# Patient Record
Sex: FEMALE | Race: WHITE | Employment: FULL TIME | URBAN - METROPOLITAN AREA
[De-identification: names, ages, dates, MRNs, and addresses within clinical notes are randomized per-mention and may not be internally consistent; named-entity substitution may affect disease eponyms.]

---

## 2017-02-13 PROBLEM — R93.1 ABNORMAL HEART SCORE CT: Status: ACTIVE | Noted: 2017-02-13

## 2018-08-29 ENCOUNTER — APPOINTMENT (OUTPATIENT)
Dept: CT IMAGING | Age: 53
End: 2018-08-29
Attending: EMERGENCY MEDICINE
Payer: COMMERCIAL

## 2018-08-29 ENCOUNTER — APPOINTMENT (OUTPATIENT)
Dept: ULTRASOUND IMAGING | Age: 53
End: 2018-08-29
Attending: EMERGENCY MEDICINE
Payer: COMMERCIAL

## 2018-08-29 ENCOUNTER — HOSPITAL ENCOUNTER (EMERGENCY)
Age: 53
Discharge: LEFT BEFORE DISCHARGE | End: 2018-08-29
Attending: EMERGENCY MEDICINE
Payer: COMMERCIAL

## 2018-08-29 ENCOUNTER — APPOINTMENT (OUTPATIENT)
Dept: GENERAL RADIOLOGY | Age: 53
End: 2018-08-29
Attending: EMERGENCY MEDICINE
Payer: COMMERCIAL

## 2018-08-29 VITALS
BODY MASS INDEX: 24.32 KG/M2 | RESPIRATION RATE: 18 BRPM | HEIGHT: 65 IN | SYSTOLIC BLOOD PRESSURE: 169 MMHG | DIASTOLIC BLOOD PRESSURE: 91 MMHG | WEIGHT: 146 LBS | HEART RATE: 72 BPM | TEMPERATURE: 99 F | OXYGEN SATURATION: 97 %

## 2018-08-29 DIAGNOSIS — R10.13 ABDOMINAL PAIN, EPIGASTRIC: Primary | ICD-10-CM

## 2018-08-29 DIAGNOSIS — R51.9 ACUTE NONINTRACTABLE HEADACHE, UNSPECIFIED HEADACHE TYPE: ICD-10-CM

## 2018-08-29 LAB
ALBUMIN SERPL-MCNC: 4 G/DL (ref 3.5–4.8)
ALBUMIN/GLOB SERPL: 1.2 {RATIO} (ref 1–2)
ALP LIVER SERPL-CCNC: 73 U/L (ref 41–108)
ALT SERPL-CCNC: 32 U/L (ref 14–54)
ANION GAP SERPL CALC-SCNC: 7 MMOL/L (ref 0–18)
APTT PPP: 26.6 SECONDS (ref 26.1–34.6)
AST SERPL-CCNC: 26 U/L (ref 15–41)
BASOPHILS # BLD AUTO: 0.04 X10(3) UL (ref 0–0.1)
BASOPHILS NFR BLD AUTO: 0.5 %
BILIRUB SERPL-MCNC: 0.6 MG/DL (ref 0.1–2)
BUN BLD-MCNC: 14 MG/DL (ref 8–20)
BUN/CREAT SERPL: 18.7 (ref 10–20)
CALCIUM BLD-MCNC: 8.8 MG/DL (ref 8.3–10.3)
CHLORIDE SERPL-SCNC: 104 MMOL/L (ref 101–111)
CO2 SERPL-SCNC: 28 MMOL/L (ref 22–32)
CREAT BLD-MCNC: 0.75 MG/DL (ref 0.55–1.02)
D-DIMER: <0.27 UG/ML FEU (ref 0–0.49)
EOSINOPHIL # BLD AUTO: 0.11 X10(3) UL (ref 0–0.3)
EOSINOPHIL NFR BLD AUTO: 1.3 %
ERYTHROCYTE [DISTWIDTH] IN BLOOD BY AUTOMATED COUNT: 12.4 % (ref 11.5–16)
GLOBULIN PLAS-MCNC: 3.4 G/DL (ref 2.5–4)
GLUCOSE BLD-MCNC: 96 MG/DL (ref 70–99)
HCT VFR BLD AUTO: 44.1 % (ref 34–50)
HGB BLD-MCNC: 14.5 G/DL (ref 12–16)
IMMATURE GRANULOCYTE COUNT: 0.02 X10(3) UL (ref 0–1)
IMMATURE GRANULOCYTE RATIO %: 0.2 %
INR BLD: 0.93 (ref 0.92–1.08)
LIPASE: 268 U/L (ref 73–393)
LYMPHOCYTES # BLD AUTO: 2.67 X10(3) UL (ref 0.9–4)
LYMPHOCYTES NFR BLD AUTO: 30.8 %
M PROTEIN MFR SERPL ELPH: 7.4 G/DL (ref 6.1–8.3)
MCH RBC QN AUTO: 30.7 PG (ref 27–33.2)
MCHC RBC AUTO-ENTMCNC: 32.9 G/DL (ref 31–37)
MCV RBC AUTO: 93.4 FL (ref 81–100)
MONOCYTES # BLD AUTO: 0.54 X10(3) UL (ref 0.1–1)
MONOCYTES NFR BLD AUTO: 6.2 %
NEUTROPHIL ABS PRELIM: 5.3 X10 (3) UL (ref 1.3–6.7)
NEUTROPHILS # BLD AUTO: 5.3 X10(3) UL (ref 1.3–6.7)
NEUTROPHILS NFR BLD AUTO: 61 %
OSMOLALITY SERPL CALC.SUM OF ELEC: 288 MOSM/KG (ref 275–295)
PLATELET # BLD AUTO: 269 10(3)UL (ref 150–450)
POTASSIUM SERPL-SCNC: 4.3 MMOL/L (ref 3.6–5.1)
PSA SERPL DL<=0.01 NG/ML-MCNC: 12.5 SECONDS (ref 12.5–14.1)
RBC # BLD AUTO: 4.72 X10(6)UL (ref 3.8–5.1)
RED CELL DISTRIBUTION WIDTH-SD: 43.1 FL (ref 35.1–46.3)
SODIUM SERPL-SCNC: 139 MMOL/L (ref 136–144)
TROPONIN I SERPL-MCNC: <0.046 NG/ML (ref ?–0.05)
WBC # BLD AUTO: 8.7 X10(3) UL (ref 4–13)

## 2018-08-29 PROCEDURE — 85025 COMPLETE CBC W/AUTO DIFF WBC: CPT | Performed by: EMERGENCY MEDICINE

## 2018-08-29 PROCEDURE — 85610 PROTHROMBIN TIME: CPT | Performed by: EMERGENCY MEDICINE

## 2018-08-29 PROCEDURE — 70450 CT HEAD/BRAIN W/O DYE: CPT | Performed by: EMERGENCY MEDICINE

## 2018-08-29 PROCEDURE — 96361 HYDRATE IV INFUSION ADD-ON: CPT

## 2018-08-29 PROCEDURE — 85730 THROMBOPLASTIN TIME PARTIAL: CPT | Performed by: EMERGENCY MEDICINE

## 2018-08-29 PROCEDURE — 83690 ASSAY OF LIPASE: CPT | Performed by: EMERGENCY MEDICINE

## 2018-08-29 PROCEDURE — 76700 US EXAM ABDOM COMPLETE: CPT | Performed by: EMERGENCY MEDICINE

## 2018-08-29 PROCEDURE — 80053 COMPREHEN METABOLIC PANEL: CPT | Performed by: EMERGENCY MEDICINE

## 2018-08-29 PROCEDURE — 99285 EMERGENCY DEPT VISIT HI MDM: CPT

## 2018-08-29 PROCEDURE — 85378 FIBRIN DEGRADE SEMIQUANT: CPT | Performed by: EMERGENCY MEDICINE

## 2018-08-29 PROCEDURE — 93005 ELECTROCARDIOGRAM TRACING: CPT | Performed by: INTERNAL MEDICINE

## 2018-08-29 PROCEDURE — 93005 ELECTROCARDIOGRAM TRACING: CPT

## 2018-08-29 PROCEDURE — 71046 X-RAY EXAM CHEST 2 VIEWS: CPT | Performed by: EMERGENCY MEDICINE

## 2018-08-29 PROCEDURE — 96374 THER/PROPH/DIAG INJ IV PUSH: CPT

## 2018-08-29 PROCEDURE — 84484 ASSAY OF TROPONIN QUANT: CPT | Performed by: EMERGENCY MEDICINE

## 2018-08-29 RX ORDER — ASPIRIN 81 MG/1
324 TABLET, CHEWABLE ORAL ONCE
Status: COMPLETED | OUTPATIENT
Start: 2018-08-29 | End: 2018-08-29

## 2018-08-29 RX ORDER — FAMOTIDINE 10 MG
20 TABLET ORAL DAILY
COMMUNITY
End: 2019-11-08 | Stop reason: ALTCHOICE

## 2018-08-29 RX ORDER — KETOROLAC TROMETHAMINE 30 MG/ML
30 INJECTION, SOLUTION INTRAMUSCULAR; INTRAVENOUS ONCE
Status: COMPLETED | OUTPATIENT
Start: 2018-08-29 | End: 2018-08-29

## 2018-08-29 RX ORDER — PANTOPRAZOLE SODIUM 40 MG/1
40 TABLET, DELAYED RELEASE ORAL DAILY
Qty: 30 TABLET | Refills: 0 | Status: SHIPPED | OUTPATIENT
Start: 2018-08-29 | End: 2018-09-28

## 2018-08-29 RX ORDER — MAGNESIUM HYDROXIDE/ALUMINUM HYDROXICE/SIMETHICONE 120; 1200; 1200 MG/30ML; MG/30ML; MG/30ML
30 SUSPENSION ORAL ONCE
Status: COMPLETED | OUTPATIENT
Start: 2018-08-29 | End: 2018-08-29

## 2018-08-29 RX ORDER — SODIUM CHLORIDE 9 MG/ML
INJECTION, SOLUTION INTRAVENOUS ONCE
Status: COMPLETED | OUTPATIENT
Start: 2018-08-29 | End: 2018-08-29

## 2018-08-29 RX ORDER — SUCRALFATE 1 G/1
1 TABLET ORAL
Qty: 30 TABLET | Refills: 0 | Status: SHIPPED | OUTPATIENT
Start: 2018-08-29 | End: 2019-10-28

## 2018-08-29 NOTE — ED NOTES
Pressure to head and shoulders resolved. C/o pain to sub sternal pain, rates 4/10, non-radiating. States it feels like burning pain. Requesting additional testing for abdomen. ED MD informed.

## 2018-08-29 NOTE — ED PROVIDER NOTES
Patient Seen in: THE [facility] Emergency Department In [place]    History   Patient presents with:  Chest Pain Angina (cardiovascular)  Headache (neurologic)    Stated Complaint: sharp epigastric/chest pain, now pain in head and not feeling well    HPI    Pa air)    Current:BP (!) 169/91   Pulse 72   Temp 98.5 °F (36.9 °C) (Oral)   Resp 18   Ht 165.1 cm (5' 5\")   Wt 66.2 kg   SpO2 97%   BMI 24.30 kg/m²         Physical Exam  GENERAL: Well-developed, well-nourished female sitting up breathing easily in no appa WITH DIFFERENTIAL WITH PLATELET    Narrative: The following orders were created for panel order CBC WITH DIFFERENTIAL WITH PLATELET.   Procedure                               Abnormality         Status                     --------- shows evidence of a 2.4 cm left renal cyst but no evidence of any gallstones appreciated. Patient did undergo a CT the brain secondary to patient's history of headache as well as hypertension the results of which are pending at the time of this dictation.

## 2018-08-29 NOTE — ED INITIAL ASSESSMENT (HPI)
Patient c/o to mid sternal chest pain at 10am today that radiated to back and up to back of head. Pain lasted 15 minutes. Still having pressure to head. Denies N/V.  +clammy.

## 2018-09-04 LAB
ATRIAL RATE: 60 BPM
P AXIS: 43 DEGREES
P-R INTERVAL: 144 MS
Q-T INTERVAL: 438 MS
QRS DURATION: 86 MS
QTC CALCULATION (BEZET): 438 MS
R AXIS: -21 DEGREES
T AXIS: 19 DEGREES
VENTRICULAR RATE: 60 BPM

## 2019-11-08 PROBLEM — R09.81 SINUS CONGESTION: Status: ACTIVE | Noted: 2019-11-08

## 2019-11-08 PROBLEM — K21.9 GASTROESOPHAGEAL REFLUX DISEASE, ESOPHAGITIS PRESENCE NOT SPECIFIED: Status: ACTIVE | Noted: 2019-11-08

## 2020-03-05 ENCOUNTER — HOSPITAL ENCOUNTER (OUTPATIENT)
Age: 55
Discharge: HOME | End: 2020-03-05
Payer: COMMERCIAL

## 2020-03-05 VITALS
OXYGEN SATURATION: 99 % | SYSTOLIC BLOOD PRESSURE: 124 MMHG | WEIGHT: 150 LBS | DIASTOLIC BLOOD PRESSURE: 73 MMHG | HEIGHT: 65 IN | RESPIRATION RATE: 20 BRPM | BODY MASS INDEX: 24.99 KG/M2 | TEMPERATURE: 99 F | HEART RATE: 106 BPM

## 2020-03-05 DIAGNOSIS — J10.1 INFLUENZA A: Primary | ICD-10-CM

## 2020-03-05 LAB
POCT INFLUENZA A: POSITIVE
POCT INFLUENZA B: NEGATIVE

## 2020-03-05 PROCEDURE — 87502 INFLUENZA DNA AMP PROBE: CPT | Performed by: NURSE PRACTITIONER

## 2020-03-05 PROCEDURE — 99214 OFFICE O/P EST MOD 30 MIN: CPT

## 2020-03-05 PROCEDURE — 99213 OFFICE O/P EST LOW 20 MIN: CPT

## 2020-03-05 RX ORDER — OSELTAMIVIR PHOSPHATE 75 MG/1
75 CAPSULE ORAL 2 TIMES DAILY
Qty: 10 CAPSULE | Refills: 0 | Status: SHIPPED | OUTPATIENT
Start: 2020-03-05 | End: 2020-03-10 | Stop reason: SINTOL

## 2020-03-05 RX ORDER — BENZONATATE 100 MG/1
100 CAPSULE ORAL 3 TIMES DAILY PRN
Qty: 30 CAPSULE | Refills: 0 | Status: SHIPPED | OUTPATIENT
Start: 2020-03-05 | End: 2020-03-10

## 2020-03-05 NOTE — ED PROVIDER NOTES
Patient Seen in: [facility] [place] [facility]      History   Patient presents with:  Cough    Stated Complaint: fever / fast heart rate / chills / headache / cough / aches x 1 day    HPI    25-year-old female presents with cough, fever, headache, Ear: Tympanic membrane, ear canal and external ear normal.      Nose: Congestion present. Mouth/Throat:      Mouth: Mucous membranes are moist.      Pharynx: No oropharyngeal exudate or posterior oropharyngeal erythema.    Eyes:      Extraocular Moveme medications    Oseltamivir Phosphate (TAMIFLU) 75 MG Oral Cap  Take 1 capsule (75 mg total) by mouth 2 (two) times daily for 5 days.   Qty: 10 capsule Refills: 0    benzonatate 100 MG Oral Cap  Take 1 capsule (100 mg total) by mouth 3 (three) times daily as

## 2020-08-12 ENCOUNTER — OFFICE VISIT (OUTPATIENT)
Dept: OBGYN CLINIC | Facility: CLINIC | Age: 55
End: 2020-08-12
Payer: COMMERCIAL

## 2020-08-12 VITALS
WEIGHT: 154.38 LBS | BODY MASS INDEX: 26 KG/M2 | TEMPERATURE: 98 F | DIASTOLIC BLOOD PRESSURE: 98 MMHG | HEART RATE: 79 BPM | SYSTOLIC BLOOD PRESSURE: 142 MMHG

## 2020-08-12 DIAGNOSIS — Z12.4 CERVICAL CANCER SCREENING: ICD-10-CM

## 2020-08-12 DIAGNOSIS — Z78.0 MENOPAUSE: ICD-10-CM

## 2020-08-12 DIAGNOSIS — Z01.419 WELL FEMALE EXAM WITH ROUTINE GYNECOLOGICAL EXAM: Primary | ICD-10-CM

## 2020-08-12 DIAGNOSIS — Z12.31 ENCOUNTER FOR SCREENING MAMMOGRAM FOR MALIGNANT NEOPLASM OF BREAST: ICD-10-CM

## 2020-08-12 PROCEDURE — 3080F DIAST BP >= 90 MM HG: CPT | Performed by: OBSTETRICS & GYNECOLOGY

## 2020-08-12 PROCEDURE — 88175 CYTOPATH C/V AUTO FLUID REDO: CPT | Performed by: OBSTETRICS & GYNECOLOGY

## 2020-08-12 PROCEDURE — 87624 HPV HI-RISK TYP POOLED RSLT: CPT | Performed by: OBSTETRICS & GYNECOLOGY

## 2020-08-12 PROCEDURE — 99386 PREV VISIT NEW AGE 40-64: CPT | Performed by: OBSTETRICS & GYNECOLOGY

## 2020-08-12 PROCEDURE — 3077F SYST BP >= 140 MM HG: CPT | Performed by: OBSTETRICS & GYNECOLOGY

## 2020-08-12 NOTE — PROGRESS NOTES
GYN H&P     2020  12:05 PM    CC: Patient is here for annnual    HPI: patient is a 48year old  here for her annual gyn exam.   She has no complaints. Menopausal. Uses amberen with good symptom relief. Has some vaginal dryness.  Denies any pelvi anxiety/depression   • Heart Disorder Maternal Uncle         CABG   • Other (Other) Sister         gall bladder   • Heart Disorder Maternal Uncle         pacemaker     Social History    Socioeconomic History      Marital status:       Spouse name: N Helmet: Not Asked        Seat Belt: Yes        Self-Exams: Not Asked    Social History Narrative      Not on file      ROS:     Review of Systems:  General: denies fevers, chills, fatigue and malaise.    Eyes: no visual changes, denies headaches  ENT: no co or fissures                     Vagina: normal pink mucosa, no lesions, normal clear discharge.                       Uterus: av, mobile, non tender, normal size                     Cervix: parous, no lesions                     Adnexa: non tender, no steph

## 2020-08-13 LAB — HPV I/H RISK 1 DNA SPEC QL NAA+PROBE: NEGATIVE

## 2020-08-17 NOTE — PROGRESS NOTES
Left detailed message with normal pap and HPV  OK per Matheus Mariee Spine appears normal, range of motion is not limited, no muscle or joint tenderness

## 2021-03-26 ENCOUNTER — HOSPITAL ENCOUNTER (OUTPATIENT)
Dept: ULTRASOUND IMAGING | Age: 56
Discharge: HOME | End: 2021-03-26
Attending: FAMILY MEDICINE
Payer: COMMERCIAL

## 2021-03-26 DIAGNOSIS — R14.0 ABDOMINAL BLOATING: ICD-10-CM

## 2021-03-26 DIAGNOSIS — R10.12 LEFT UPPER QUADRANT ABDOMINAL PAIN: ICD-10-CM

## 2021-03-26 PROCEDURE — 76700 US EXAM ABDOM COMPLETE: CPT | Performed by: FAMILY MEDICINE

## 2021-03-27 NOTE — PROGRESS NOTES
Patient previously reviewed results on [employer]. Attached MD comments to results for patient to review. Nothing further needed from MD or nurse. Closing encounter.

## 2021-04-14 ENCOUNTER — HOSPITAL ENCOUNTER (OUTPATIENT)
Age: 56
Discharge: HOME | End: 2021-04-14
Payer: COMMERCIAL

## 2021-04-14 VITALS
DIASTOLIC BLOOD PRESSURE: 82 MMHG | RESPIRATION RATE: 16 BRPM | WEIGHT: 154 LBS | BODY MASS INDEX: 25.66 KG/M2 | SYSTOLIC BLOOD PRESSURE: 163 MMHG | TEMPERATURE: 100 F | OXYGEN SATURATION: 96 % | HEART RATE: 90 BPM | HEIGHT: 65 IN

## 2021-04-14 DIAGNOSIS — U07.1 COVID-19 VIRUS INFECTION: Primary | ICD-10-CM

## 2021-04-14 PROCEDURE — 99213 OFFICE O/P EST LOW 20 MIN: CPT | Performed by: NURSE PRACTITIONER

## 2021-04-14 PROCEDURE — 99453 REM MNTR PHYSIOL PARAM SETUP: CPT | Performed by: NURSE PRACTITIONER

## 2021-04-14 PROCEDURE — 99212 OFFICE O/P EST SF 10 MIN: CPT | Performed by: NURSE PRACTITIONER

## 2021-04-14 NOTE — ED INITIAL ASSESSMENT (HPI)
Cough x 1 week, sinus pressure/headache x 2 weeks - worse over last 2 days, tmax 100f;  has covid, pt tested neg on sunday

## 2021-04-14 NOTE — ED PROVIDER NOTES
Patient Seen in: [facility]      History   Patient presents with:  Cough/URI  Fever    Stated Complaint: sinus pressure,post nasal drip    HPI/Subjective:   HPI  78-year-old female presents to immediate care thinking she possibly has a si distress. Appearance: Normal appearance. She is well-developed. She is not ill-appearing or toxic-appearing.    HENT:      Right Ear: Tympanic membrane, ear canal and external ear normal.      Left Ear: Tympanic membrane, ear canal and external ear norm encounter diagnosis)     Disposition:  Discharge  4/14/2021 11:47 am    Follow-up:  Princess Faust, [address] [phone]    Call   As needed          Medications Prescribed:  Discharge Medication List as of 4/14/2021 11:47

## 2021-04-19 ENCOUNTER — APPOINTMENT (OUTPATIENT)
Dept: CT IMAGING | Age: 56
DRG: 177 | End: 2021-04-19
Attending: EMERGENCY MEDICINE
Payer: COMMERCIAL

## 2021-04-19 ENCOUNTER — APPOINTMENT (OUTPATIENT)
Dept: GENERAL RADIOLOGY | Age: 56
DRG: 177 | End: 2021-04-19
Attending: EMERGENCY MEDICINE
Payer: COMMERCIAL

## 2021-04-19 ENCOUNTER — HOSPITAL ENCOUNTER (INPATIENT)
Facility: HOSPITAL | Age: 56
LOS: 5 days | Discharge: HOME | DRG: 177 | End: 2021-04-24
Attending: EMERGENCY MEDICINE | Admitting: HOSPITALIST
Payer: COMMERCIAL

## 2021-04-19 DIAGNOSIS — U07.1 PNEUMONIA DUE TO COVID-19 VIRUS: Primary | ICD-10-CM

## 2021-04-19 DIAGNOSIS — J12.82 PNEUMONIA DUE TO COVID-19 VIRUS: Primary | ICD-10-CM

## 2021-04-19 PROBLEM — R73.9 HYPERGLYCEMIA: Status: ACTIVE | Noted: 2021-04-19

## 2021-04-19 PROBLEM — E87.1 HYPONATREMIA: Status: ACTIVE | Noted: 2021-04-19

## 2021-04-19 PROBLEM — D69.6 THROMBOCYTOPENIA (HCC): Status: ACTIVE | Noted: 2021-04-19

## 2021-04-19 PROCEDURE — 84145 PROCALCITONIN (PCT): CPT | Performed by: EMERGENCY MEDICINE

## 2021-04-19 PROCEDURE — 83615 LACTATE (LD) (LDH) ENZYME: CPT | Performed by: EMERGENCY MEDICINE

## 2021-04-19 PROCEDURE — 85379 FIBRIN DEGRADATION QUANT: CPT | Performed by: EMERGENCY MEDICINE

## 2021-04-19 PROCEDURE — 84484 ASSAY OF TROPONIN QUANT: CPT | Performed by: EMERGENCY MEDICINE

## 2021-04-19 PROCEDURE — 99285 EMERGENCY DEPT VISIT HI MDM: CPT

## 2021-04-19 PROCEDURE — 96361 HYDRATE IV INFUSION ADD-ON: CPT

## 2021-04-19 PROCEDURE — 82550 ASSAY OF CK (CPK): CPT | Performed by: EMERGENCY MEDICINE

## 2021-04-19 PROCEDURE — 85025 COMPLETE CBC W/AUTO DIFF WBC: CPT | Performed by: EMERGENCY MEDICINE

## 2021-04-19 PROCEDURE — 96375 TX/PRO/DX INJ NEW DRUG ADDON: CPT

## 2021-04-19 PROCEDURE — 71275 CT ANGIOGRAPHY CHEST: CPT | Performed by: EMERGENCY MEDICINE

## 2021-04-19 PROCEDURE — 86140 C-REACTIVE PROTEIN: CPT | Performed by: EMERGENCY MEDICINE

## 2021-04-19 PROCEDURE — 71045 X-RAY EXAM CHEST 1 VIEW: CPT | Performed by: EMERGENCY MEDICINE

## 2021-04-19 PROCEDURE — 93010 ELECTROCARDIOGRAM REPORT: CPT

## 2021-04-19 PROCEDURE — 82728 ASSAY OF FERRITIN: CPT | Performed by: EMERGENCY MEDICINE

## 2021-04-19 PROCEDURE — 81001 URINALYSIS AUTO W/SCOPE: CPT | Performed by: EMERGENCY MEDICINE

## 2021-04-19 PROCEDURE — 93005 ELECTROCARDIOGRAM TRACING: CPT

## 2021-04-19 PROCEDURE — 83880 ASSAY OF NATRIURETIC PEPTIDE: CPT | Performed by: EMERGENCY MEDICINE

## 2021-04-19 PROCEDURE — 80053 COMPREHEN METABOLIC PANEL: CPT | Performed by: EMERGENCY MEDICINE

## 2021-04-19 PROCEDURE — 36415 COLL VENOUS BLD VENIPUNCTURE: CPT

## 2021-04-19 PROCEDURE — 87040 BLOOD CULTURE FOR BACTERIA: CPT | Performed by: EMERGENCY MEDICINE

## 2021-04-19 PROCEDURE — 96374 THER/PROPH/DIAG INJ IV PUSH: CPT

## 2021-04-19 RX ORDER — PANTOPRAZOLE SODIUM 20 MG/1
20 TABLET, DELAYED RELEASE ORAL
Refills: 1 | Status: DISCONTINUED | OUTPATIENT
Start: 2021-04-20 | End: 2021-04-24

## 2021-04-19 RX ORDER — DEXAMETHASONE SODIUM PHOSPHATE 10 MG/ML
6 INJECTION, SOLUTION INTRAMUSCULAR; INTRAVENOUS ONCE
Status: COMPLETED | OUTPATIENT
Start: 2021-04-19 | End: 2021-04-19

## 2021-04-19 RX ORDER — ONDANSETRON 2 MG/ML
4 INJECTION INTRAMUSCULAR; INTRAVENOUS EVERY 4 HOURS PRN
Status: DISCONTINUED | OUTPATIENT
Start: 2021-04-19 | End: 2021-04-19

## 2021-04-19 RX ORDER — ENOXAPARIN SODIUM 100 MG/ML
40 INJECTION SUBCUTANEOUS DAILY
Status: DISCONTINUED | OUTPATIENT
Start: 2021-04-19 | End: 2021-04-24

## 2021-04-19 RX ORDER — BISACODYL 10 MG
10 SUPPOSITORY, RECTAL RECTAL
Status: DISCONTINUED | OUTPATIENT
Start: 2021-04-19 | End: 2021-04-24

## 2021-04-19 RX ORDER — LISINOPRIL 40 MG/1
40 TABLET ORAL DAILY
Refills: 3 | Status: DISCONTINUED | OUTPATIENT
Start: 2021-04-20 | End: 2021-04-24

## 2021-04-19 RX ORDER — POLYETHYLENE GLYCOL 3350 17 G/17G
17 POWDER, FOR SOLUTION ORAL DAILY PRN
Status: DISCONTINUED | OUTPATIENT
Start: 2021-04-19 | End: 2021-04-24

## 2021-04-19 RX ORDER — SODIUM CHLORIDE 9 MG/ML
INJECTION, SOLUTION INTRAVENOUS CONTINUOUS
Status: ACTIVE | OUTPATIENT
Start: 2021-04-19 | End: 2021-04-19

## 2021-04-19 RX ORDER — DEXAMETHASONE SODIUM PHOSPHATE 4 MG/ML
6 VIAL (ML) INJECTION DAILY
Status: DISCONTINUED | OUTPATIENT
Start: 2021-04-19 | End: 2021-04-23

## 2021-04-19 RX ORDER — ONDANSETRON 2 MG/ML
4 INJECTION INTRAMUSCULAR; INTRAVENOUS EVERY 6 HOURS PRN
Status: DISCONTINUED | OUTPATIENT
Start: 2021-04-19 | End: 2021-04-24

## 2021-04-19 RX ORDER — DOCUSATE SODIUM 100 MG/1
100 CAPSULE, LIQUID FILLED ORAL 2 TIMES DAILY
Status: DISCONTINUED | OUTPATIENT
Start: 2021-04-19 | End: 2021-04-24

## 2021-04-19 RX ORDER — ACETAMINOPHEN 325 MG/1
TABLET ORAL
Status: DISCONTINUED
Start: 2021-04-19 | End: 2021-04-19 | Stop reason: WASHOUT

## 2021-04-19 RX ORDER — SODIUM CHLORIDE 9 MG/ML
INJECTION, SOLUTION INTRAVENOUS CONTINUOUS
Status: DISCONTINUED | OUTPATIENT
Start: 2021-04-19 | End: 2021-04-21

## 2021-04-19 RX ORDER — ACETAMINOPHEN 500 MG
1000 TABLET ORAL ONCE
Status: COMPLETED | OUTPATIENT
Start: 2021-04-19 | End: 2021-04-19

## 2021-04-19 RX ORDER — ACETAMINOPHEN 325 MG/1
650 TABLET ORAL EVERY 6 HOURS PRN
Status: DISCONTINUED | OUTPATIENT
Start: 2021-04-19 | End: 2021-04-24

## 2021-04-19 RX ORDER — ONDANSETRON 2 MG/ML
4 INJECTION INTRAMUSCULAR; INTRAVENOUS ONCE
Status: COMPLETED | OUTPATIENT
Start: 2021-04-19 | End: 2021-04-19

## 2021-04-19 RX ORDER — ATORVASTATIN CALCIUM 40 MG/1
40 TABLET, FILM COATED ORAL DAILY
Status: DISCONTINUED | OUTPATIENT
Start: 2021-04-20 | End: 2021-04-24

## 2021-04-19 RX ORDER — SODIUM PHOSPHATE, DIBASIC AND SODIUM PHOSPHATE, MONOBASIC 7; 19 G/133ML; G/133ML
1 ENEMA RECTAL ONCE AS NEEDED
Status: DISCONTINUED | OUTPATIENT
Start: 2021-04-19 | End: 2021-04-24

## 2021-04-19 NOTE — CONSULTS
INFECTIOUS DISEASE CONSULT NOTE    Raf Sis Patient Status:  Inpatient    1965 MRN [id number]   [facility] 5NW-A Attending Marquez Hernandez MD   Hosp Day # 0 PCP Maggi Kim MD       Reason for Consultation:  Covid 19 infection atorvastatin (LIPITOR) tab 40 mg, 40 mg, Oral, Daily  •  [START ON 4/20/2021] lisinopril tab 40 mg, 40 mg, Oral, Daily  •  [START ON 4/20/2021] Pantoprazole Sodium (PROTONIX) EC tab 20 mg, 20 mg, Oral, QAM AC  •  Enoxaparin Sodium (LOVENOX) 40 MG/0.4ML inj 91.2   MCH 30.4   MCHC 33.3   RDW 13.0   NEPRELIM 3.68   WBC 4.8   .0*     Recent Labs   Lab 04/19/21  0749   *   BUN 12   CREATSERUM 0.96   GFRAA 77   GFRNAA 67   CA 8.2*   ALB 3.2*   *   K 3.6      CO2 26.0   ALKPHO 60   AST 46* Technologist)  Patient states that she passed out this morning. States she is dizzy and is nauseous.    CONTRAST USED:  100cc of Omnipaque 350  FINDINGS:  LUNGS:  There is nodular and confluent consolidation within the lungs, most notable within the lower l bile duct diameter is 4 mm. PANCREAS:  Normal. SPLEEN:  Normal. KIDNEYS:  Normal.  Right kidney measures 10.7 cm. Left kidney measures 11.6 cm. AORTA/IVC:  Normal.            CONCLUSION:  Sonographically unremarkable abdomen.     Dictated by (CST): Eduardo Melendez Case and plan d/w pt over the phone    Thank you for allowing me to participate in the care of this patient. Please do not hesitate to call if you have any questions.    I will continue to follow with you and will make further recommendations based on h

## 2021-04-19 NOTE — COVID NURSING ASSESSMENT
COVID-19 Daily Discharge Readiness-Nursing    O2 Sat at Rest:    92 % on 2L   O2 Sat with Exertion:    % on    liters   Temperature max from last 24 hrs: Temp (24hrs), Av.1 °F (37.8 °C), Min:98.7 °F (37.1 °C), Max:101.4 °F (38.6 °C)    Inflammatory Ma

## 2021-04-19 NOTE — PLAN OF CARE
- dietician following     NURSING ADMISSION NOTE      Patient admitted via Cart  Oriented to room. Safety precautions initiated. Bed in low position. Call light in reach. Pt A&Ox4, received on 2LNC maintaining spO2 >92%, NSR on tele. Low-grade temp upon arrival. VSS.  Pt n

## 2021-04-19 NOTE — H&P
[facility] Hospitalist H&P       CC: Patient presents with:  Covid  Dizziness  Fever       PCP: Marian Rosa MD    History of Present Illness:57 y/o f w hx of htn, hl, gerd tested positive 4/14 in outpt clinic, c/o fevers w coughing, no blood, seen in ER w ct EOMs intact. Nose: Nares normal. Septum midline. Mucosa normal. No drainage.    Throat: Lips, mucosa, and tongue normal. Teeth and gums normal.   Neck: Supple, symmetrical, trachea midline, no cervical or supraclavicular lymph adenopathy, thyroid: no enl PLAN:   56 y/o f w hx of htn, hl, gerd tested positive 4/14 in outpt clinic, c/o fevers    Covid: positive 4/14  -on 2L nc  -steroids  -c/s ID for remdesevir, rocephin w fever  -monitor inflammatory markers, dimer 2    Acute hypoxia d/t covid    Htn: ace

## 2021-04-19 NOTE — ED INITIAL ASSESSMENT (HPI)
Dx covid Tuesday - fever began Saturday C/o loss of taste and smell, aches, headache  Lightheadedness since yesterday

## 2021-04-19 NOTE — PLAN OF CARE
Problem: Patient/Family Goals  Goal: Patient/Family Long Term Goal  Description: Patient's Long Term Goal:    Interventions:  - See additional Care Plan goals for specific interventions  Outcome: Progressing  Goal: Patient/Family Short Term Goal  Descrip

## 2021-04-19 NOTE — ED PROVIDER NOTES
Patient Seen in: THE [facility] Emergency Department In [place]      History   Patient presents with:  Covid  Dizziness  Fever    Stated Complaint: passed out this am. dizzy. nausea, diarrhea. fever. + covid on Tuesday.     HPI/Subjective:   HPI     Patient is Wt 68.9 kg   LMP 08/20/2015 (Approximate)   SpO2 92%   BMI 25.29 kg/m²         Physical Exam  GENERAL: Patient resting  on the cart in no acute distress. HEENT: Extraocular muscles intact, pupils equal round reactive to light  neck supple, no meningismus. limits   PRO BETA NATRIURETIC PEPTIDE - Normal   TROPONIN I - Normal   URINE MICROSCOPIC W REFLEX CULTURE - Normal   CBC WITH DIFFERENTIAL WITH PLATELET    Narrative:      The following orders were created for panel order CBC WITH DIFFERENTIAL WITH PLATELET virus  (primary encounter diagnosis)     Disposition:  Admit  4/19/2021 10:40 am    Follow-up:  No follow-up provider specified.         Medications Prescribed:  Current Discharge Medication List                          Hospital Problems     Present on Adm

## 2021-04-20 PROCEDURE — 93010 ELECTROCARDIOGRAM REPORT: CPT | Performed by: INTERNAL MEDICINE

## 2021-04-20 PROCEDURE — 87493 C DIFF AMPLIFIED PROBE: CPT | Performed by: HOSPITALIST

## 2021-04-20 PROCEDURE — 93005 ELECTROCARDIOGRAM TRACING: CPT

## 2021-04-20 PROCEDURE — 87046 STOOL CULTR AEROBIC BACT EA: CPT | Performed by: HOSPITALIST

## 2021-04-20 PROCEDURE — 85379 FIBRIN DEGRADATION QUANT: CPT | Performed by: HOSPITALIST

## 2021-04-20 PROCEDURE — 87427 SHIGA-LIKE TOXIN AG IA: CPT | Performed by: HOSPITALIST

## 2021-04-20 PROCEDURE — 86140 C-REACTIVE PROTEIN: CPT | Performed by: HOSPITALIST

## 2021-04-20 PROCEDURE — XW033E5 INTRODUCTION OF REMDESIVIR ANTI-INFECTIVE INTO PERIPHERAL VEIN, PERCUTANEOUS APPROACH, NEW TECHNOLOGY GROUP 5: ICD-10-PCS | Performed by: HOSPITALIST

## 2021-04-20 PROCEDURE — 84484 ASSAY OF TROPONIN QUANT: CPT | Performed by: HOSPITALIST

## 2021-04-20 PROCEDURE — 87045 FECES CULTURE AEROBIC BACT: CPT | Performed by: HOSPITALIST

## 2021-04-20 PROCEDURE — 85027 COMPLETE CBC AUTOMATED: CPT | Performed by: HOSPITALIST

## 2021-04-20 PROCEDURE — 82728 ASSAY OF FERRITIN: CPT | Performed by: HOSPITALIST

## 2021-04-20 PROCEDURE — 87077 CULTURE AEROBIC IDENTIFY: CPT | Performed by: HOSPITALIST

## 2021-04-20 PROCEDURE — 80053 COMPREHEN METABOLIC PANEL: CPT | Performed by: INTERNAL MEDICINE

## 2021-04-20 PROCEDURE — 3E0333Z INTRODUCTION OF ANTI-INFLAMMATORY INTO PERIPHERAL VEIN, PERCUTANEOUS APPROACH: ICD-10-PCS | Performed by: HOSPITALIST

## 2021-04-20 RX ORDER — SIMETHICONE 80 MG
80 TABLET,CHEWABLE ORAL 3 TIMES DAILY
Status: DISCONTINUED | OUTPATIENT
Start: 2021-04-20 | End: 2021-04-24

## 2021-04-20 RX ORDER — CALCIUM CARBONATE 200(500)MG
500 TABLET,CHEWABLE ORAL
Status: DISCONTINUED | OUTPATIENT
Start: 2021-04-20 | End: 2021-04-24

## 2021-04-20 RX ORDER — SIMETHICONE 80 MG
160 TABLET,CHEWABLE ORAL 3 TIMES DAILY
Status: DISCONTINUED | OUTPATIENT
Start: 2021-04-20 | End: 2021-04-24

## 2021-04-20 NOTE — COVID NURSING ASSESSMENT
COVID-19 Daily Discharge Readiness-Nursing    O2 Sat at Rest:     %   O2 Sat with Exertion:    % on    liters   Temperature max from last 24 hrs: Temp (24hrs), Av °F (37.8 °C), Min:98.7 °F (37.1 °C), Max:101.4 °F (38.6 °C)    Inflammatory Markers:   R

## 2021-04-20 NOTE — PROGRESS NOTES
INFECTIOUS DISEASE PROGRESS NOTE    Sigrid Bolanos Patient Status:  Inpatient    1965 MRN [id number]   [facility] 5NW-A Attending Rupert Corrales MD   Hosp Day # 1 PCP MD Augustina Robbins  CCP    Subjective: Pt not exam examined as trying to preserve PPE and limit exposure/ transmission      Laboratory Data:    Recent Labs   Lab 04/19/21  0749 04/20/21  0714   RBC 4.34 4.16   HGB 13.2 12.7   HCT 39.6 37.4   MCV 91.2 89.9   MCH 30.4 30.5   MCHC 33.3 34.0   RDW 13.0 13.0 multislice CT angiography is performed through the pulmonary arterial anatomy. 3D volume renderings are generated. Dose reduction techniques were used.  Dose information is transmitted to the ACR ([address] [place] [address] of Radiology) Ana Luisa Fowler [address] [facility] Radiology Melo includes images of the liver, gallbladder, common bile duct, pancreas, spleen, kidneys, IVC, and aorta. PATIENT STATED HISTORY: (As transcribed by Technologist)  Patient has LUQ pain. FINDINGS:  LIVER:  Normal size and echogenicity.  No significant mass consistently  - prone as able, otherwise encourage ambulation and IS  - trend inflammatory markers    2. acute hypoxic resp failure due to above  - wean O2 as able  - prone as tolerated, should also encourage ambulation and IS when not in bed    3.  Mirta Sykes

## 2021-04-20 NOTE — DIETARY NOTE
[address]     Admitting diagnosis:  Pneumonia due to COVID-19 virus [U07.1, J12.82]    Ht: 165.1 cm (5' 5\")  Wt: 68.9 kg (152 lb). Body mass index is 25.29 kg/m².   IBW: 56.8kg    Wt Readings from Last 6 Encou

## 2021-04-20 NOTE — COVID NURSING ASSESSMENT
COVID-19 Daily Discharge Readiness-Nursing    O2 Sat at Rest:   93  % on 1L  O2 Sat with Exertion:    % on    liters   Temperature max from last 24 hrs: Temp (24hrs), Av.8 °F (37.7 °C), Min:98.7 °F (37.1 °C), Max:101.1 °F (38.4 °C)    Inflammatory [place] home    F/U appointment scheduled within 7 days. ..      [] [facility] ([facility])     [] Pulmonologist     [] Primary Care Physician     [] Other Specialty

## 2021-04-20 NOTE — PLAN OF CARE
Problem: Patient/Family Goals  Goal: Patient/Family Long Term Goal  Description: Patient's Long Term Goal:     Interventions:  - See additional Care Plan goals for specific interventions  Outcome: Progressing  Goal: Patient/Family Short Term Goal  Descri

## 2021-04-20 NOTE — PROGRESS NOTES
[facility] Hospitalist Progress Note     CC: Hospital Follow up    PCP: Mena Manning MD       Assessment/Plan:     Principal Problem:    Pneumonia due to COVID-19 virus  Active Problems:    Hyponatremia    Thrombocytopenia ([address]    Hyperglycemia    56 y/o f w hx Labs:     Recent Labs   Lab 04/19/21  0749 04/20/21  0714   RBC 4.34 4.16   HGB 13.2 12.7   HCT 39.6 37.4   MCV 91.2 89.9   MCH 30.4 30.5   MCHC 33.3 34.0   RDW 13.0 13.0   NEPRELIM 3.68  --    WBC 4.8 7.6   .0* 145.0*         Recent Labs   Lab 04

## 2021-04-20 NOTE — CM/SW NOTE
Patient was screened during rounds and no needs are identified at this time. Patient admitted from home with spouse. RN to contact SW/CM if needs arise.

## 2021-04-20 NOTE — PAYOR COMM NOTE
--------------  ADMISSION REVIEW     Payor: Danni GONZALEZ PPO  Subscriber #:  [id number]  Authorization Number: [id number]    Admit date: 4/19/21  Admit time: 12:24 PM     Patient Seen in: THE [facility] Emergency Department In [place]    History   Stated Compl coarse breath sounds bilaterally. CARDIOVASCULAR: + S1-S2, regular rate and rhythm, no murmurs. BACK: No CVA tenderness, no midline bony tenderness. ABDOMEN: + Bowel sounds, soft, nontender, nondistended. No rebound, no guarding, no hepatosplenomegaly. left lower lobe/retrocardiac consolidation is suggested.         CT chest   1. No acute pulmonary embolism.    2. There are nodular and confluent regions of consolidation within the lungs, most notable within the lower lobes, suspicious for COVID-19 pneumon protonix    FN:  - IVF:none  - Diet:cardiac    DVT Prophy: lovneox  Atrophy:  Lines:    Dispo: pending clinical course      ID:    Janice Byrd is a a(n) 55year old female admitted today with covid pna. Symptoms started about 7 days ago.  Tested + on 4/1 4/14  -down to 1L nc  -steroids 1  - remdesevir 1  -monitor inflammatory markers, dimer down, crp up  -ct chest neg for pe     Acute hypoxia d/t covid     Diarrhea: cdiff/stool cx p  -ct w question of colitis at hepatic flexure likely d/t covid      Meds:

## 2021-04-21 PROCEDURE — 80053 COMPREHEN METABOLIC PANEL: CPT | Performed by: INTERNAL MEDICINE

## 2021-04-21 PROCEDURE — 86140 C-REACTIVE PROTEIN: CPT | Performed by: HOSPITALIST

## 2021-04-21 PROCEDURE — 82728 ASSAY OF FERRITIN: CPT | Performed by: HOSPITALIST

## 2021-04-21 PROCEDURE — 85379 FIBRIN DEGRADATION QUANT: CPT | Performed by: HOSPITALIST

## 2021-04-21 NOTE — PAYOR COMM NOTE
--------------  4/21 CONTINUED STAY REVIEW    Payor: [employer] PPO  Subscriber #:  [id number]  Authorization Number: [id number]       ID:    Remdesivir D#3  Dexa  CCP     Subjective: Pt not examined, TELEVISIT performed as trying to preserve PPE and pr with possible colitis. Stool studies sent and pend, will follow       NURSING:   pt alert and oriented x4. Maintaining O2 sats greater than 90% on 1L. VSS. Tele: NSR, ST (with exertion). Pt c/o headache relived with PRN tylenol. Voids.  Pt up standby assist

## 2021-04-21 NOTE — COVID NURSING ASSESSMENT
COVID-19 Daily Discharge Readiness-Nursing    O2 Sat at Rest:     %   O2 Sat with Exertion:    % on    liters   Temperature max from last 24 hrs: Temp (24hrs), Av.5 °F (37.5 °C), Min:98.4 °F (36.9 °C), Max:101.1 °F (38.4 °C)    Inflammatory Markers:

## 2021-04-21 NOTE — COVID NURSING ASSESSMENT
COVID-19 Daily Discharge Readiness-Nursing    O2 Sat at Rest:    94 % on 1L  O2 Sat with Exertion:    % on    liters   Temperature max from last 24 hrs: Temp (24hrs), Av.4 °F (36.9 °C), Min:98.2 °F (36.8 °C), Max:98.8 °F (37.1 °C)    Inflammatory [place]

## 2021-04-21 NOTE — PROGRESS NOTES
[address] [facility] [address] Note                                                                   [address]  2/21/1965    CC: FU COVID    Interval History:  - Feels well today, on 1L NC, * 141 139   K 3.6 3.6 4.1    107 107   CO2 26.0 25.0 25.0           ROS: no change to ROS from documentation yesterday, except as otherwise noted in the Interval History above.     Assessment/Plan:  56 y/o f w hx of htn, hl, gerd tested positi

## 2021-04-21 NOTE — PROGRESS NOTES
INFECTIOUS DISEASE PROGRESS NOTE    [facility] Patient Status:  Inpatient    1965 MRN [id number]   [facility] 5NW-A Attending Shellie Crigler, MD   Hosp Day # 2 PCP MD Lina Coolivir D#3  Dexa  CCP    Subjective: Pt not pertinent positives and negatives above    Physical Exam:    Vital signs: Blood pressure 139/81, pulse 69, temperature 98.4 °F (36.9 °C), temperature source Oral, resp.  rate 18, height 5' 5\" (1.651 m), weight 152 lb 11.2 oz (69.3 kg), last menstrual perio 2.73* 2.90* 2.48*   NORMA 263.0 320.3 330.9   *  --   --    DDIMER 1.95* 0.95* 0.86*       Microbiology    Reviewed in EMR,     Radiology: CT ANGIOGRAPHY, CHEST (CPT=71275)    Result Date: 4/19/2021  PROCEDURE:  CT ANGIOGRAPHY, CHEST (CPT=71275)  COMP 4/19/2021 at 9:58 AM     Finalized by (CST): Ary Sanon MD on 4/19/2021 at 10:09 AM       US ABDOMEN COMPLETE (CPT=76700)    Result Date: 3/26/2021  PROCEDURE:  US ABDOMEN COMPLETE (CPT=76700)  COMPARISON:  US CONRADO, US ABDOMEN COMPLETE (CPT=7 Patchy left lower lobe/retrocardiac consolidation is suggested. Dictated by (CST): Antwan Alford MD on 4/19/2021 at 8:38 AM     Finalized by (CST): Antwan Alford MD on 4/19/2021 at 8:38 AM          ASSESSMENT/ PLAN:    1.  Covid 19 infection wi

## 2021-04-21 NOTE — PLAN OF CARE
Problem: Patient/Family Goals  Goal: Patient/Family Long Term Goal  Description: Patient's Long Term Goal: go home    Interventions:  - go home w/ adequate resources   - See additional Care Plan goals for specific interventions  4/21/2021 1025 by Fadia Jose Angel

## 2021-04-21 NOTE — PROGRESS NOTES
At shift change, pt complains of \"not feeling well. \" c/o chest discomfort with no difficulty breathing or SOB. L arm tingling and numbness as well. Face flushed, vitals stable. MD paged. Awaiting response. 2049: see new orders.  Will update MD regardi

## 2021-04-22 PROCEDURE — 85379 FIBRIN DEGRADATION QUANT: CPT | Performed by: HOSPITALIST

## 2021-04-22 PROCEDURE — 80053 COMPREHEN METABOLIC PANEL: CPT | Performed by: INTERNAL MEDICINE

## 2021-04-22 PROCEDURE — 82728 ASSAY OF FERRITIN: CPT | Performed by: HOSPITALIST

## 2021-04-22 PROCEDURE — 86140 C-REACTIVE PROTEIN: CPT | Performed by: HOSPITALIST

## 2021-04-22 RX ORDER — TRAZODONE HYDROCHLORIDE 50 MG/1
50 TABLET ORAL NIGHTLY PRN
Status: DISCONTINUED | OUTPATIENT
Start: 2021-04-22 | End: 2021-04-24

## 2021-04-22 NOTE — PLAN OF CARE
Multidisciplinary Discharge Rounds held 4/21/2021. Treatment team members present today include , , Charge Nurse, Nurse, RT, PT and Pharmacy caring for [facility].      Other care providers present:    Mobility Goal: Ambulate report SOB or any respiratory difficulty  - Respiratory Therapy support as indicated  - Manage/alleviate anxiety  - Monitor for signs/symptoms of CO2 retention  Outcome: Progressing

## 2021-04-22 NOTE — PROGRESS NOTES
INFECTIOUS DISEASE PROGRESS NOTE    Zach Alvarado Patient Status:  Inpatient    1965 MRN [id number]   [facility] 5NW-A Attending Chirsty Berman MD   Hosp Day # 3 PCP MD Augustina Bella D#4  Dexa  CCP    Subjective: Pt not 115/66, pulse 74, temperature 98.2 °F (36.8 °C), temperature source Oral, resp. rate 20, height 5' 5\" (1.651 m), weight 152 lb 11.2 oz (69.3 kg), last menstrual period 08/20/2015, SpO2 93 %.   Pt not examined as trying to preserve PPE and limit exposure/ t 330.9 245.6   *  --   --   --   --    DDIMER 1.95*   < > 0.95* 0.86* 0.86*    < > = values in this interval not displayed. Microbiology    Reviewed in EMR,   Hospital Encounter on 04/19/21   1.  BLOOD CULTURE     Status: None (Preliminary resul nodular and confluent regions of consolidation within the lungs, most notable within the lower lobes, suspicious for COVID-19 pneumonia. 3. There is mild wall thickening of the hepatic flexure which may represent colitis.     Dictated by (CST): Stromberg, L and lightheadedness. FINDINGS:   Mildly prominent cardiac silhouette. Pulmonary vasculature is unremarkable. Minimal interstitial abnormalities at the lung bases are noted. Patchy retrocardiac consolidation is noted. No pneumothorax.             CONC

## 2021-04-22 NOTE — CONSULTS
Pulmonary H&P/Consult       NAME: Ra Caal - ROOM: [address] [place], [place] [address]B - MRN: [id number] - Age: 55year old - :  1965    Date of Admission: 2021  7:24 AM  Admission Diagnosis: Pneumonia due to COVID-19 virus [U07.1, J12.82]  Reason for consult: Acu Tobacco Use      Smoking status: Never Smoker      Smokeless tobacco: Never Used    Vaping Use      Vaping Use: Never used    Substance and Sexual Activity      Alcohol use:  Yes        Alcohol/week: 0.0 standard drinks        Comment: occas, 2 per week, ca Mother    • Obesity Mother    • Other (Other) Mother    • Psychiatric Sister         anxiety/depression   • Heart Disorder Maternal Uncle         CABG   • Other (Other) Sister         gall bladder   • Heart Disorder Maternal Uncle         pacemaker Continuous  Oximetry Probe Site Changed: No  Pulse Ox Probe Location: Right hand                  Wt Readings from Last 3 Encounters:  04/21/21 : 152 lb 11.2 oz (69.3 kg)  04/16/21 : 154 lb (69.9 kg)  04/14/21 : 154 lb (69.9 kg)        Intake/Output Summar IS  3. Dispo  -will follow  -discussed possible discharge w/ O2 and she is amenable to it                   Vannesa Pittman  [facility] Pulmonary and Critical Care

## 2021-04-22 NOTE — PLAN OF CARE
COVID-19 Daily Discharge Readiness-Nursing    O2 Sat at Rest:  92   %   Temperature max from last 24 hrs: Temp (24hrs), Av.5 °F (36.9 °C), Min:98.3 °F (36.8 °C), Max:98.9 °F (37.2 °C)    Inflammatory Markers:   Recent Labs   Lab 21  0656 21 identified and updated with the plan of care.       Problem: Patient/Family Goals  Goal: Patient/Family Long Term Goal  Description: Patient's Long Term Goal: go home    Interventions:  - go home w/ adequate resources   - See additional Care Plan goals for

## 2021-04-22 NOTE — PROGRESS NOTES
C/O" Maxwell good , I want no problems, "    Report from staff regarding this patient received and record reviewed  prior to seeing this patient   Behavior over the last 24 hours:    Sleep: Good  Appetite:ok  Medication side effects:[id number]  ROS:improving   Mental Status Evaluation:  Appearance:  Dressed appropraitely   Behavior:  cooperative   Speech:  normal   Mood:  euthymic   Affect:    blunted   Thought Process:  Goal directed   Thought Content:  normal   Perceptual Disturbances: Denied AV hallucination   Risk Potential: NO JOSE    Sensorium:  normal   Cognition:  intact   Consciousness:  Alert, OX3   Attention: Fair   Insight:  limited   Judgment: limited   Gait/Station: With in normal range   Motor Activity: With in normal range     Progress Toward Goals: working on current treatment goals, no changes  Made in treatment plan   Recommended Treatment: Continue with group therapy, milieu therapy and occupational therapy  Risks, benefits and possible side effects of Medications:   Risks, benefits, and possible side effects of medications explained to patient and patient verbalizes understanding        Medications:   current meds:   Current Facility-Administered Medications   Medication Dose Route Frequency    acetaminophen (TYLENOL) tablet 650 mg  650 mg Oral Q6H PRN    acetaminophen (TYLENOL) tablet 975 mg  975 mg Oral Q6H PRN    aluminum-magnesium hydroxide-simethicone (MYLANTA) 200-200-20 mg/5 mL oral suspension 30 mL  30 mL Oral Q4H PRN    benztropine (COGENTIN) injection 1 mg  1 mg Intramuscular Q6H PRN    benztropine (COGENTIN) tablet 1 mg  1 mg Oral Q6H PRN    haloperidol (HALDOL) tablet 5 mg  5 mg Oral Q8H PRN    haloperidol (HALDOL) tablet 5 mg  5 mg Oral HS    haloperidol lactate (HALDOL) injection 5 mg  5 mg Intramuscular Q6H PRN    hydrOXYzine HCL (ATARAX) tablet 25 mg  25 mg Oral Q6H PRN    ibuprofen (MOTRIN) tablet 600 mg  600 mg Oral Q8H PRN    LORazepam (ATIVAN) 2 mg/mL injection 1 [facility] Hospitalist Progress Note                                                                   [address]  2/21/1965    CC: FU COVID    Interval History:  - Remains on 1-2L at rest toda Interval History above.     Assessment/Plan:  54 y/o f w hx of htn, hl, gerd tested positive 4/14 in outpt clinic, c/o fevers     Covid: positive 4/14  -down to 1L nc  -steroids while hypoxic  -remdesivir per ID- to complete tomorrow  -monitor inflammatory mg  1 mg Intramuscular Q6H PRN    magnesium hydroxide (MILK OF MAGNESIA) 400 mg/5 mL oral suspension 30 mL  30 mL Oral Daily PRN    multivitamin-minerals (CENTRUM) tablet 1 tablet  1 tablet Oral Daily    OLANZapine (ZyPREXA) IM injection 10 mg  10 mg Intramuscular Q3H PRN    OLANZapine (ZyPREXA) tablet 10 mg  10 mg Oral Q3H PRN    risperiDONE (RisperDAL M-TABS) dispersible tablet 1 mg  1 mg Oral Q3H PRN    traZODone (DESYREL) tablet 50 mg  50 mg Oral HS PRN     Labs: NA    Assessment, Diagnosis  and Plan: continue with current meds and goals, F/U tomorrow    Counseling / Coordination of Care  Total floor / unit time spent today20 minutes  minutes  Greater than 50% of total time was spent with the patient and / or family counseling and / or coordination of care  A description of the counseling / coordination of care:      Lisa Carrasco MD

## 2021-04-22 NOTE — PAYOR COMM NOTE
--------------  4/22 CONTINUED STAY REVIEW    Payor: [employer] PPO  Subscriber #:  [id number]  Authorization Number: [id number]      NURSING:  Remdesivir day 4/5  Decadron IV/PO day 4/10  On 2 L of oxygen,  Tried to wean pt off her oxygen but she desat Israel Julien RN    4/21/2021 6304 Given 40 mg Oral Raf Pack RN      Calcium Carbonate Antacid (TUMS) chewable tab 500 mg     Date Action Dose Route User    4/22/2021 0304 Given 500 mg Oral Mendy Ta RN      dexamethasone Sodium Phosphate (DEC

## 2021-04-22 NOTE — PROGRESS NOTES
COVID-19 Daily Discharge Readiness-Nursing    O2 Sat at Rest:     91%  On 1L  O2 Sat with Exertion:    % on    liters   Temperature max from last 24 hrs: Temp (24hrs), Av.4 °F (36.9 °C), Min:98.2 °F (36.8 °C), Max:98.9 °F (37.2 °C)    Inflammatory Entriken Manners

## 2021-04-23 PROCEDURE — 85379 FIBRIN DEGRADATION QUANT: CPT | Performed by: NURSE PRACTITIONER

## 2021-04-23 PROCEDURE — 85025 COMPLETE CBC W/AUTO DIFF WBC: CPT | Performed by: NURSE PRACTITIONER

## 2021-04-23 PROCEDURE — 83615 LACTATE (LD) (LDH) ENZYME: CPT | Performed by: NURSE PRACTITIONER

## 2021-04-23 PROCEDURE — 82728 ASSAY OF FERRITIN: CPT | Performed by: NURSE PRACTITIONER

## 2021-04-23 PROCEDURE — 80053 COMPREHEN METABOLIC PANEL: CPT | Performed by: INTERNAL MEDICINE

## 2021-04-23 PROCEDURE — 86140 C-REACTIVE PROTEIN: CPT | Performed by: NURSE PRACTITIONER

## 2021-04-23 RX ORDER — DEXAMETHASONE 6 MG/1
6 TABLET ORAL DAILY
Qty: 5 TABLET | Refills: 0 | Status: SHIPPED | OUTPATIENT
Start: 2021-04-24 | End: 2021-04-29

## 2021-04-23 NOTE — CM/SW NOTE
Care Progression Note:  Active Acute Medical Issue:   Pneumonia due to COVID-19 virus   Per daily rounds, last remdesivir 4pm today, cont deca, on RA-1L    Other Contributing Medical Factors/Dx. :   GERD, HL, HTN    Length of stay: 4  GMLOS: 5.4  Avoidable

## 2021-04-23 NOTE — COVID NURSING ASSESSMENT
COVID-19 Daily Discharge Readiness-Nursing    O2 Sat at Rest:   91 % on 1L   Temperature max from last 24 hrs: Temp (24hrs), Av.3 °F (36.8 °C), Min:98 °F (36.7 °C), Max:98.6 °F (37 °C)    Inflammatory Markers:   Recent Labs   Lab 21  0656

## 2021-04-23 NOTE — CONSULTS
[facility] Pulmonary, Critical Care and Sleep    Kaelyn Fields Patient Status:  Inpatient    1965 MRN [id number]   [facility] 5NW-A Attending Jeannie Dye Day # 4 PCP Xiomara Pickard MD       Date of Admission: 2021  7 Lab 04/21/21  0656 04/22/21  0729 04/23/21  0637   * 106* 91   BUN 17 15 15   CREATSERUM 0.72 0.55 0.75   GFRAA 109 122 104   GFRNAA 95 106 90   CA 8.4* 8.4* 8.4*    140 138   K 4.1 4.2 4.0    107 107   CO2 25.0 26.0 26.0   AST 45* 40*

## 2021-04-23 NOTE — COVID NURSING ASSESSMENT
COVID-19 Daily Discharge Readiness-Nursing    O2 Sat at Rest:  SPO2% on Room Air at Rest: 93  %   O2 Sat with Exertion: 91  % on room air   Temperature max from last 24 hrs: Temp (24hrs), Av.4 °F (36.9 °C), Min:98 °F (36.7 °C), Max:98.7 °F (37.1 °C)

## 2021-04-23 NOTE — PROGRESS NOTES
[facility] Hospitalist Progress Note                                                                   [address]  2/21/1965    CC: FU COVID    Interval History:  - Remains on 1-2L at rest toda above.    Assessment/Plan:  56 y/o f w hx of htn, hl, gerd tested positive 4/14 in outpt clinic, c/o fevers     Covid: positive 4/14  -down to 1L nc  -steroids while hypoxic  -remdesivir per ID- to complete tomorrow  -monitor inflammatory markers -downtren

## 2021-04-23 NOTE — PROGRESS NOTES
INFECTIOUS DISEASE PROGRESS NOTE    Michelle Jovel Patient Status:  Inpatient    1965 MRN [id number]   [facility] 5NW-A Attending Royce Phan MD   [facility] Day # 4 PCP MD Augustina Hammer#5  Dexa  CCP    Subjective: Pt not temperature 98.1 °F (36.7 °C), temperature source Oral, resp. rate 18, height 5' 5\" (1.651 m), weight 152 lb 11.2 oz (69.3 kg), last menstrual period 08/20/2015, SpO2 92 %.   Pt not examined as trying to preserve PPE and limit exposure/ transmission      L > 2.48* 1.11* 0.77*   NORMA 263.0   < > 330.9 245.6 164.4   *  --   --   --  273*   DDIMER 1.95*   < > 0.86* 0.86* 0.78*    < > = values in this interval not displayed. Microbiology    Reviewed in EMR,   Hospital Encounter on 04/19/21   1.  BLOOD 1. No acute pulmonary embolism. 2. There are nodular and confluent regions of consolidation within the lungs, most notable within the lower lobes, suspicious for COVID-19 pneumonia.  3. There is mild wall thickening of the hepatic flexure which may represen experienced loss of taste and smell, achiness, and lightheadedness. FINDINGS:   Mildly prominent cardiac silhouette. Pulmonary vasculature is unremarkable. Minimal interstitial abnormalities at the lung bases are noted.   Patchy retrocardiac consolidat

## 2021-04-23 NOTE — PROGRESS NOTES
04/23/21 1102   Mobility   O2 walk?  Yes   SPO2% on Room Air at Rest 93   SPO2% Ambulation on Room Air 91   Ambulation oxygen flow (liters per minute) 0

## 2021-04-24 VITALS
SYSTOLIC BLOOD PRESSURE: 129 MMHG | RESPIRATION RATE: 18 BRPM | HEART RATE: 80 BPM | OXYGEN SATURATION: 92 % | WEIGHT: 152.69 LBS | DIASTOLIC BLOOD PRESSURE: 81 MMHG | BODY MASS INDEX: 25.44 KG/M2 | HEIGHT: 65 IN | TEMPERATURE: 99 F

## 2021-04-24 PROCEDURE — 80053 COMPREHEN METABOLIC PANEL: CPT | Performed by: INTERNAL MEDICINE

## 2021-04-24 PROCEDURE — 85379 FIBRIN DEGRADATION QUANT: CPT | Performed by: NURSE PRACTITIONER

## 2021-04-24 PROCEDURE — 82728 ASSAY OF FERRITIN: CPT | Performed by: NURSE PRACTITIONER

## 2021-04-24 PROCEDURE — 86140 C-REACTIVE PROTEIN: CPT | Performed by: NURSE PRACTITIONER

## 2021-04-24 PROCEDURE — 83615 LACTATE (LD) (LDH) ENZYME: CPT | Performed by: NURSE PRACTITIONER

## 2021-04-24 PROCEDURE — 85025 COMPLETE CBC W/AUTO DIFF WBC: CPT | Performed by: NURSE PRACTITIONER

## 2021-04-24 RX ORDER — ASPIRIN 81 MG/1
81 TABLET ORAL DAILY
Qty: 30 TABLET | Refills: 0 | Status: SHIPPED | OUTPATIENT
Start: 2021-04-24 | End: 2021-06-16

## 2021-04-24 NOTE — DISCHARGE PLANNING
NURSING DISCHARGE NOTE    Discharged Home via Wheelchair. Accompanied by Support staff  Belongings Taken by patient/family. Dischage AVS given to patient. Discussed f/u orders and new prescriptions- patient verbalized understanding.  All consults edwar cheney

## 2021-04-24 NOTE — PROGRESS NOTES
04/24/21 0856   Mobility   O2 walk?  Yes   SPO2% on Room Air at Rest 93   SPO2% Ambulation on Room Air 91

## 2021-04-24 NOTE — COVID NURSING ASSESSMENT
COVID-19 Daily Discharge Readiness-Nursing    O2 Sat at Rest:  SPO2% on Room Air at Rest: 93  %   Temperature max from last 24 hrs: Temp (24hrs), Av.4 °F (36.9 °C), Min:98.1 °F (36.7 °C), Max:98.7 °F (37.1 °C)    Inflammatory Markers:   Recent Labs   L

## 2021-04-25 NOTE — DISCHARGE SUMMARY
Mame Rich 89 Patient Status:  Inpatient    1965 MRN [id number]   [facility] 5NW-A Attending No att. providers found   Hosp Day # 5 PCP Army Buster MD     Date of Admission: 2021  Date o capsule  Refills: 1           Where to Get Your Medications      These medications were sent to [address] [phone], [address] [place] [address]    Phone: [phone] [18-20] 18  BP: (122-148)/(75-94) 129/81    Physical Exam:    General: No acute distress. Respiratory: Clear to auscultation bilaterally. No wheezes. No crackles  Cardiovascular: S1, S2. Regular rate and rhythm.  No murmurs  Abdomen: Soft, nontender, nond

## 2021-06-16 PROCEDURE — 88305 TISSUE EXAM BY PATHOLOGIST: CPT | Performed by: INTERNAL MEDICINE

## 2021-07-12 ENCOUNTER — HOSPITAL ENCOUNTER (OUTPATIENT)
Dept: MAMMOGRAPHY | Age: 56
Discharge: HOME | End: 2021-07-12
Attending: OBSTETRICS & GYNECOLOGY
Payer: COMMERCIAL

## 2021-07-12 DIAGNOSIS — Z01.419 WELL FEMALE EXAM WITH ROUTINE GYNECOLOGICAL EXAM: ICD-10-CM

## 2021-07-12 DIAGNOSIS — Z12.31 ENCOUNTER FOR SCREENING MAMMOGRAM FOR MALIGNANT NEOPLASM OF BREAST: ICD-10-CM

## 2021-07-12 PROCEDURE — 77067 SCR MAMMO BI INCL CAD: CPT | Performed by: OBSTETRICS & GYNECOLOGY

## 2021-07-12 PROCEDURE — 77063 BREAST TOMOSYNTHESIS BI: CPT | Performed by: OBSTETRICS & GYNECOLOGY

## 2021-08-12 ENCOUNTER — HOSPITAL ENCOUNTER (EMERGENCY)
Age: 56
Discharge: HOME | End: 2021-08-13
Attending: EMERGENCY MEDICINE
Payer: COMMERCIAL

## 2021-08-12 ENCOUNTER — APPOINTMENT (OUTPATIENT)
Dept: CT IMAGING | Age: 56
End: 2021-08-12
Attending: EMERGENCY MEDICINE
Payer: COMMERCIAL

## 2021-08-12 DIAGNOSIS — K57.32 SIGMOID DIVERTICULITIS: Primary | ICD-10-CM

## 2021-08-12 LAB
BASOPHILS # BLD AUTO: 0.03 X10(3) UL (ref 0–0.2)
BASOPHILS NFR BLD AUTO: 0.2 %
EOSINOPHIL # BLD AUTO: 0.1 X10(3) UL (ref 0–0.7)
EOSINOPHIL NFR BLD AUTO: 0.8 %
ERYTHROCYTE [DISTWIDTH] IN BLOOD BY AUTOMATED COUNT: 12.4 %
HCT VFR BLD AUTO: 41.6 %
HGB BLD-MCNC: 14 G/DL
IMM GRANULOCYTES # BLD AUTO: 0.03 X10(3) UL (ref 0–1)
IMM GRANULOCYTES NFR BLD: 0.2 %
LYMPHOCYTES # BLD AUTO: 2.32 X10(3) UL (ref 1–4)
LYMPHOCYTES NFR BLD AUTO: 18.3 %
MCH RBC QN AUTO: 30.6 PG (ref 26–34)
MCHC RBC AUTO-ENTMCNC: 33.7 G/DL (ref 31–37)
MCV RBC AUTO: 90.8 FL
MONOCYTES # BLD AUTO: 1.09 X10(3) UL (ref 0.1–1)
MONOCYTES NFR BLD AUTO: 8.6 %
NEUTROPHILS # BLD AUTO: 9.08 X10 (3) UL (ref 1.5–7.7)
NEUTROPHILS # BLD AUTO: 9.08 X10(3) UL (ref 1.5–7.7)
NEUTROPHILS NFR BLD AUTO: 71.9 %
PLATELET # BLD AUTO: 239 10(3)UL (ref 150–450)
RBC # BLD AUTO: 4.58 X10(6)UL
WBC # BLD AUTO: 12.7 X10(3) UL (ref 4–11)

## 2021-08-12 PROCEDURE — 85025 COMPLETE CBC W/AUTO DIFF WBC: CPT | Performed by: EMERGENCY MEDICINE

## 2021-08-12 PROCEDURE — 81003 URINALYSIS AUTO W/O SCOPE: CPT | Performed by: EMERGENCY MEDICINE

## 2021-08-12 PROCEDURE — 74177 CT ABD & PELVIS W/CONTRAST: CPT | Performed by: EMERGENCY MEDICINE

## 2021-08-12 PROCEDURE — 96361 HYDRATE IV INFUSION ADD-ON: CPT

## 2021-08-12 PROCEDURE — 99284 EMERGENCY DEPT VISIT MOD MDM: CPT

## 2021-08-12 PROCEDURE — 96360 HYDRATION IV INFUSION INIT: CPT

## 2021-08-12 PROCEDURE — 83690 ASSAY OF LIPASE: CPT | Performed by: EMERGENCY MEDICINE

## 2021-08-12 PROCEDURE — 80053 COMPREHEN METABOLIC PANEL: CPT | Performed by: EMERGENCY MEDICINE

## 2021-08-12 RX ORDER — SODIUM CHLORIDE 9 MG/ML
INJECTION, SOLUTION INTRAVENOUS CONTINUOUS
Status: DISCONTINUED | OUTPATIENT
Start: 2021-08-13 | End: 2021-08-13

## 2021-08-13 VITALS
SYSTOLIC BLOOD PRESSURE: 146 MMHG | DIASTOLIC BLOOD PRESSURE: 92 MMHG | HEART RATE: 73 BPM | TEMPERATURE: 99 F | RESPIRATION RATE: 16 BRPM | WEIGHT: 149.06 LBS | BODY MASS INDEX: 25 KG/M2 | OXYGEN SATURATION: 98 %

## 2021-08-13 LAB
ALBUMIN SERPL-MCNC: 3.5 G/DL (ref 3.4–5)
ALBUMIN/GLOB SERPL: 1.1 {RATIO} (ref 1–2)
ALP LIVER SERPL-CCNC: 77 U/L
ALT SERPL-CCNC: 31 U/L
ANION GAP SERPL CALC-SCNC: 5 MMOL/L (ref 0–18)
AST SERPL-CCNC: 17 U/L (ref 15–37)
BILIRUB SERPL-MCNC: 0.5 MG/DL (ref 0.1–2)
BILIRUB UR QL STRIP.AUTO: NEGATIVE
BUN BLD-MCNC: 15 MG/DL (ref 7–18)
CALCIUM BLD-MCNC: 8.9 MG/DL (ref 8.5–10.1)
CHLORIDE SERPL-SCNC: 108 MMOL/L (ref 98–112)
CLARITY UR REFRACT.AUTO: CLEAR
CO2 SERPL-SCNC: 26 MMOL/L (ref 21–32)
COLOR UR AUTO: YELLOW
CREAT BLD-MCNC: 0.83 MG/DL
GLOBULIN PLAS-MCNC: 3.3 G/DL (ref 2.8–4.4)
GLUCOSE BLD-MCNC: 126 MG/DL (ref 70–99)
GLUCOSE UR STRIP.AUTO-MCNC: NEGATIVE MG/DL
KETONES UR STRIP.AUTO-MCNC: NEGATIVE MG/DL
LEUKOCYTE ESTERASE UR QL STRIP.AUTO: NEGATIVE
LIPASE SERPL-CCNC: 212 U/L (ref 73–393)
M PROTEIN MFR SERPL ELPH: 6.8 G/DL (ref 6.4–8.2)
NITRITE UR QL STRIP.AUTO: NEGATIVE
OSMOLALITY SERPL CALC.SUM OF ELEC: 290 MOSM/KG (ref 275–295)
PH UR STRIP.AUTO: 6.5 [PH] (ref 5–8)
POTASSIUM SERPL-SCNC: 3.7 MMOL/L (ref 3.5–5.1)
PROT UR STRIP.AUTO-MCNC: NEGATIVE MG/DL
SARS-COV-2 RNA RESP QL NAA+PROBE: NOT DETECTED
SODIUM SERPL-SCNC: 139 MMOL/L (ref 136–145)
SP GR UR STRIP.AUTO: 1.02 (ref 1–1.03)
UROBILINOGEN UR STRIP.AUTO-MCNC: 0.2 MG/DL

## 2021-08-13 RX ORDER — LEVOFLOXACIN 750 MG/1
750 TABLET ORAL ONCE
Status: COMPLETED | OUTPATIENT
Start: 2021-08-13 | End: 2021-08-13

## 2021-08-13 RX ORDER — METRONIDAZOLE 500 MG/1
500 TABLET ORAL 3 TIMES DAILY
Qty: 30 TABLET | Refills: 0 | Status: SHIPPED | OUTPATIENT
Start: 2021-08-13 | End: 2021-08-19

## 2021-08-13 RX ORDER — METRONIDAZOLE 500 MG/1
500 TABLET ORAL ONCE
Status: COMPLETED | OUTPATIENT
Start: 2021-08-13 | End: 2021-08-13

## 2021-08-13 RX ORDER — LEVOFLOXACIN 750 MG/1
750 TABLET ORAL DAILY
Qty: 10 TABLET | Refills: 0 | Status: SHIPPED | OUTPATIENT
Start: 2021-08-13 | End: 2021-08-20

## 2021-08-13 NOTE — ED PROVIDER NOTES
Patient Seen in: THE [facility] Emergency [facility] In [place]      History   Patient presents with:  Abdomen/Flank Pain    Stated Complaint: lower abd pain, fever this am    HPI/Subjective:   HPI    Patient is a pleasant 60-year-old female, presenting for e 16   Wt 67.6 kg   LMP 08/20/2015 (Approximate)   SpO2 98%   BMI 24.80 kg/m²         Physical Exam    Vital signs noted. GENERAL: Patient is awake and alert, resting comfortably on the cart, in no apparent distress.    HEENT: Head is without evidence of tr Final result                 Please view results for these tests on the individual orders.    RAINBOW DRAW 2[facility] radiology preliminary radiology report  CT abdomen and pelvis with contrast: Findings consistent was subsequently discharged without incident.                        Disposition and Plan     Clinical Impression:  Sigmoid diverticulitis  (primary encounter diagnosis)     Disposition:  Discharge  8/13/2021  2:07 am    Follow-up:  Tomas Garcia MD  3100

## 2021-08-13 NOTE — ED INITIAL ASSESSMENT (HPI)
Lower abd pain with temp 99 this am. Denies nvd. Had small amount \" wet bm\" pta. Pain sharp stabbing, worse with sitting and when coughing. Sitting and voiding makes pain worse.

## 2021-08-19 PROBLEM — K57.92 DIVERTICULITIS: Status: ACTIVE | Noted: 2021-08-19

## 2021-08-23 ENCOUNTER — APPOINTMENT (OUTPATIENT)
Dept: GENERAL RADIOLOGY | Age: 56
End: 2021-08-23
Attending: EMERGENCY MEDICINE
Payer: COMMERCIAL

## 2021-08-23 ENCOUNTER — HOSPITAL ENCOUNTER (EMERGENCY)
Age: 56
Discharge: HOME | End: 2021-08-23
Attending: EMERGENCY MEDICINE
Payer: COMMERCIAL

## 2021-08-23 VITALS
WEIGHT: 141 LBS | SYSTOLIC BLOOD PRESSURE: 139 MMHG | DIASTOLIC BLOOD PRESSURE: 88 MMHG | RESPIRATION RATE: 18 BRPM | HEART RATE: 66 BPM | HEIGHT: 64 IN | OXYGEN SATURATION: 96 % | BODY MASS INDEX: 24.07 KG/M2

## 2021-08-23 DIAGNOSIS — E87.6 HYPOKALEMIA: ICD-10-CM

## 2021-08-23 DIAGNOSIS — R00.2 PALPITATIONS: Primary | ICD-10-CM

## 2021-08-23 LAB
ALBUMIN SERPL-MCNC: 3.8 G/DL (ref 3.4–5)
ALBUMIN/GLOB SERPL: 1.3 {RATIO} (ref 1–2)
ALP LIVER SERPL-CCNC: 65 U/L
ALT SERPL-CCNC: 34 U/L
ANION GAP SERPL CALC-SCNC: 5 MMOL/L (ref 0–18)
AST SERPL-CCNC: 22 U/L (ref 15–37)
ATRIAL RATE: 72 BPM
BASOPHILS # BLD AUTO: 0.04 X10(3) UL (ref 0–0.2)
BASOPHILS NFR BLD AUTO: 0.6 %
BILIRUB SERPL-MCNC: 0.5 MG/DL (ref 0.1–2)
BUN BLD-MCNC: 11 MG/DL (ref 7–18)
CALCIUM BLD-MCNC: 8.8 MG/DL (ref 8.5–10.1)
CHLORIDE SERPL-SCNC: 109 MMOL/L (ref 98–112)
CO2 SERPL-SCNC: 27 MMOL/L (ref 21–32)
CREAT BLD-MCNC: 0.8 MG/DL
EOSINOPHIL # BLD AUTO: 0.03 X10(3) UL (ref 0–0.7)
EOSINOPHIL NFR BLD AUTO: 0.5 %
ERYTHROCYTE [DISTWIDTH] IN BLOOD BY AUTOMATED COUNT: 12.1 %
GLOBULIN PLAS-MCNC: 2.9 G/DL (ref 2.8–4.4)
GLUCOSE BLD-MCNC: 136 MG/DL (ref 70–99)
HCT VFR BLD AUTO: 43 %
HGB BLD-MCNC: 14.2 G/DL
IMM GRANULOCYTES # BLD AUTO: 0.02 X10(3) UL (ref 0–1)
IMM GRANULOCYTES NFR BLD: 0.3 %
LYMPHOCYTES # BLD AUTO: 1.87 X10(3) UL (ref 1–4)
LYMPHOCYTES NFR BLD AUTO: 28.4 %
M PROTEIN MFR SERPL ELPH: 6.7 G/DL (ref 6.4–8.2)
MCH RBC QN AUTO: 29.8 PG (ref 26–34)
MCHC RBC AUTO-ENTMCNC: 33 G/DL (ref 31–37)
MCV RBC AUTO: 90.1 FL
MONOCYTES # BLD AUTO: 0.45 X10(3) UL (ref 0.1–1)
MONOCYTES NFR BLD AUTO: 6.8 %
NEUTROPHILS # BLD AUTO: 4.18 X10 (3) UL (ref 1.5–7.7)
NEUTROPHILS # BLD AUTO: 4.18 X10(3) UL (ref 1.5–7.7)
NEUTROPHILS NFR BLD AUTO: 63.4 %
OSMOLALITY SERPL CALC.SUM OF ELEC: 293 MOSM/KG (ref 275–295)
P AXIS: 40 DEGREES
P-R INTERVAL: 146 MS
PLATELET # BLD AUTO: 303 10(3)UL (ref 150–450)
POTASSIUM SERPL-SCNC: 3.2 MMOL/L (ref 3.5–5.1)
Q-T INTERVAL: 412 MS
QRS DURATION: 86 MS
QTC CALCULATION (BEZET): 451 MS
R AXIS: -27 DEGREES
RBC # BLD AUTO: 4.77 X10(6)UL
SODIUM SERPL-SCNC: 141 MMOL/L (ref 136–145)
T AXIS: 19 DEGREES
TROPONIN I SERPL-MCNC: <0.045 NG/ML (ref ?–0.04)
VENTRICULAR RATE: 72 BPM
WBC # BLD AUTO: 6.6 X10(3) UL (ref 4–11)

## 2021-08-23 PROCEDURE — 93005 ELECTROCARDIOGRAM TRACING: CPT

## 2021-08-23 PROCEDURE — 99284 EMERGENCY DEPT VISIT MOD MDM: CPT

## 2021-08-23 PROCEDURE — 80053 COMPREHEN METABOLIC PANEL: CPT | Performed by: EMERGENCY MEDICINE

## 2021-08-23 PROCEDURE — 85025 COMPLETE CBC W/AUTO DIFF WBC: CPT | Performed by: EMERGENCY MEDICINE

## 2021-08-23 PROCEDURE — 84484 ASSAY OF TROPONIN QUANT: CPT | Performed by: EMERGENCY MEDICINE

## 2021-08-23 PROCEDURE — 99285 EMERGENCY DEPT VISIT HI MDM: CPT

## 2021-08-23 PROCEDURE — 71045 X-RAY EXAM CHEST 1 VIEW: CPT | Performed by: EMERGENCY MEDICINE

## 2021-08-23 PROCEDURE — 96360 HYDRATION IV INFUSION INIT: CPT

## 2021-08-23 PROCEDURE — 93010 ELECTROCARDIOGRAM REPORT: CPT

## 2021-08-23 RX ORDER — POTASSIUM CHLORIDE 20 MEQ/1
40 TABLET, EXTENDED RELEASE ORAL ONCE
Status: COMPLETED | OUTPATIENT
Start: 2021-08-23 | End: 2021-08-23

## 2021-08-23 NOTE — ED PROVIDER NOTES
Patient Seen in: [facility] In [place]      History   Patient presents with:  Chest Pain Angina    Stated Complaint: chest pain, elevated HR    HPI/Subjective:   HPI    43-year-old female comes to the hospital complaint of having diff src --    SpO2 08/23/21 0932 96 %   O2 Device 08/23/21 0822 None (Room air)       Current:/88   Pulse 66   Resp 18   Ht 162.6 cm (5' 4\")   Wt 64 kg   LMP 08/20/2015 (Approximate)   SpO2 96%   BMI 24.20 kg/m²         Physical Exam    HEENT : NCAT, EO MPR imaging was performed. Dose reduction techniques were used. Dose information is transmitted to the [facility]) NRDR ([address]) which includes the Dose Index Registry.   PATIENT STATED HISTORY:(As transcri 8/13/2021 at 8:06 AM       XR CHEST AP PORTABLE  (CPT=71045)    Result Date: 8/23/2021            PROCEDURE:  XR CHEST AP PORTABLE  (CPT=71045)  TECHNIQUE:  AP chest radiograph was obtained.   COMPARISON:  [facility], XR, XR CHEST AP PORTABLE  (CPT=71045), discussed. The patient is discharged in good condition.                                 Disposition and Plan     Clinical Impression:  Palpitations  (primary encounter diagnosis)  Hypokalemia     Disposition:  Discharge  8/23/2021  9:37 am    Follow-u

## 2021-08-23 NOTE — ED INITIAL ASSESSMENT (HPI)
Chest discomfort and irregular HR, lightheaded since last night. no nausea/sweating. Was diagnosed of diverticultis, states she had a \"severe\" reactions to the antibiotic- had panic attacks, unable to sleep, stopped the antibiotic. Was seen by her PCP.

## 2021-08-30 ENCOUNTER — HOSPITAL ENCOUNTER (OUTPATIENT)
Dept: NUCLEAR MEDICINE | Facility: HOSPITAL | Age: 56
Discharge: HOME | End: 2021-08-30
Attending: INTERNAL MEDICINE
Payer: COMMERCIAL

## 2021-08-30 DIAGNOSIS — R10.12 ABDOMINAL PAIN, LUQ (LEFT UPPER QUADRANT): ICD-10-CM

## 2021-08-30 PROCEDURE — 78227 HEPATOBIL SYST IMAGE W/DRUG: CPT | Performed by: INTERNAL MEDICINE

## 2021-09-02 ENCOUNTER — HOSPITAL ENCOUNTER (OUTPATIENT)
Dept: CV DIAGNOSTICS | Age: 56
Discharge: HOME | End: 2021-09-02
Attending: NURSE PRACTITIONER
Payer: COMMERCIAL

## 2021-09-02 DIAGNOSIS — R93.1 ABNORMAL HEART SCORE CT: ICD-10-CM

## 2021-09-02 DIAGNOSIS — R00.2 PALPITATIONS: ICD-10-CM

## 2021-09-02 PROCEDURE — 93306 TTE W/DOPPLER COMPLETE: CPT | Performed by: NURSE PRACTITIONER

## 2021-10-12 ENCOUNTER — TELEPHONE (OUTPATIENT)
Dept: PHYSICAL THERAPY | Facility: HOSPITAL | Age: 56
End: 2021-10-12

## 2021-10-14 ENCOUNTER — OFFICE VISIT (OUTPATIENT)
Dept: SPEECH THERAPY | Age: 56
End: 2021-10-14
Attending: OTOLARYNGOLOGY
Payer: COMMERCIAL

## 2021-10-14 DIAGNOSIS — J38.3 VOCAL CORD DYSFUNCTION: ICD-10-CM

## 2021-10-14 PROCEDURE — 92524 BEHAVRAL QUALIT ANALYS VOICE: CPT

## 2021-10-14 PROCEDURE — 92507 TX SP LANG VOICE COMM INDIV: CPT

## 2021-10-14 NOTE — PROGRESS NOTES
VOCAL CORD DYSFUNCTION EVALUATION:   Referring Physician: Dr. Ofelia Rubalcava  Diagnosis: vocal cord dysfunction J38.3     Date of Service: 10/14/2021     PATIENT SUMMARY   Oscar Contreras is a 55year old y/o female  who presents to therapy today with complaints SOB episodes, improved breathing when laryngeal tension decreases. Aimee Villeda would benefit from skilled Speech Therapy to address the above impairments to improve her ability to participate fully in her daily activities across environments.     Precautions: demonstrate easy breathing strategies during mild exertion in 9/10 trials.   3) Pt will report improved breathing and decreased coughing across daily tasks for the duration of 1 month in order to improve his/her ability to breathe efficiently and fully part

## 2021-10-18 ENCOUNTER — OFFICE VISIT (OUTPATIENT)
Dept: SPEECH THERAPY | Age: 56
End: 2021-10-18
Attending: OTOLARYNGOLOGY
Payer: COMMERCIAL

## 2021-10-18 DIAGNOSIS — J38.3 VOCAL CORD DYSFUNCTION: ICD-10-CM

## 2021-10-18 PROCEDURE — 92507 TX SP LANG VOICE COMM INDIV: CPT

## 2021-10-18 NOTE — PROGRESS NOTES
Diagnosis: vocal cord dysfunction J38.3      Precautions: none  Insurance Type (# Auth): [employer] PPO   Date POC Expires: 1/12/2022      Total Treatment time: 60 min  Charges: [id number]    Treatment Number: 2    Subjective: Patient answered questions to enter the

## 2021-10-22 ENCOUNTER — APPOINTMENT (OUTPATIENT)
Dept: SPEECH THERAPY | Age: 56
End: 2021-10-22
Attending: OTOLARYNGOLOGY
Payer: COMMERCIAL

## 2021-10-25 ENCOUNTER — OFFICE VISIT (OUTPATIENT)
Dept: SPEECH THERAPY | Age: 56
End: 2021-10-25
Attending: OTOLARYNGOLOGY
Payer: COMMERCIAL

## 2021-10-25 PROCEDURE — 92507 TX SP LANG VOICE COMM INDIV: CPT

## 2021-10-25 NOTE — PROGRESS NOTES
Diagnosis: vocal cord dysfunction J38.3      Precautions: none  Insurance Type (# Auth): BCBS PPO   Date POC Expires: 1/12/2022      Total Treatment time: 60 min  Charges: [id number]    Treatment Number: 3    Subjective: Patient answered questions to enter the

## 2021-10-26 ENCOUNTER — HOSPITAL ENCOUNTER (OUTPATIENT)
Age: 56
Discharge: HOME | End: 2021-10-26
Attending: EMERGENCY MEDICINE
Payer: COMMERCIAL

## 2021-10-26 VITALS
SYSTOLIC BLOOD PRESSURE: 151 MMHG | DIASTOLIC BLOOD PRESSURE: 83 MMHG | RESPIRATION RATE: 18 BRPM | OXYGEN SATURATION: 98 % | WEIGHT: 130 LBS | BODY MASS INDEX: 21.66 KG/M2 | HEART RATE: 105 BPM | HEIGHT: 65 IN | TEMPERATURE: 98 F

## 2021-10-26 DIAGNOSIS — Z20.822 CLOSE EXPOSURE TO COVID-19 VIRUS: Primary | ICD-10-CM

## 2021-10-26 PROCEDURE — 99212 OFFICE O/P EST SF 10 MIN: CPT

## 2021-10-26 PROCEDURE — 99211 OFF/OP EST MAY X REQ PHY/QHP: CPT

## 2021-10-26 NOTE — ED INITIAL ASSESSMENT (HPI)
Patient states exposed to CV-19 positive person 6 days ago  Patient had sinus pressure       tested positive today.

## 2021-10-26 NOTE — ED PROVIDER NOTES
Patient Seen in: [facility] [place]      History   Patient presents with:  Covid-19 Test    Stated Complaint: covid testing / covid history / possible exposure    Subjective:   HPI    55-year-old female presents emergency department who had Covid interaction with the patient were taken. The patient was already wearing a droplet mask on my arrival to the room.  Personal protective equipment including droplet mask, eye protection, and gloves were worn throughout the duration of the exam.  Handwashing treatment, or admission on an emergency basis. Comprehensive verbal and written discharge and follow-up instructions were provided to help prevent relapse or worsening.   Patient was instructed to follow-up with primary care provider for further evaluation

## 2021-11-01 ENCOUNTER — APPOINTMENT (OUTPATIENT)
Dept: SPEECH THERAPY | Age: 56
End: 2021-11-01
Attending: OTOLARYNGOLOGY
Payer: COMMERCIAL

## 2021-11-08 ENCOUNTER — APPOINTMENT (OUTPATIENT)
Dept: SPEECH THERAPY | Age: 56
End: 2021-11-08
Attending: OTOLARYNGOLOGY
Payer: COMMERCIAL

## 2021-11-15 ENCOUNTER — OFFICE VISIT (OUTPATIENT)
Dept: SPEECH THERAPY | Age: 56
End: 2021-11-15
Attending: OTOLARYNGOLOGY
Payer: COMMERCIAL

## 2021-11-15 PROCEDURE — 92507 TX SP LANG VOICE COMM INDIV: CPT

## 2021-11-15 NOTE — PROGRESS NOTES
Diagnosis: vocal cord dysfunction J38.3      Precautions: none  Insurance Type (# Auth): BC[employer] PPO   Date POC Expires: 1/12/2022      Total Treatment time: 60 min  Charges: [id number]    Treatment Number: 4    Subjective: Patient answered questions to enter the

## 2021-11-17 ENCOUNTER — HOSPITAL ENCOUNTER (OUTPATIENT)
Age: 56
Discharge: HOME | End: 2021-11-17
Payer: COMMERCIAL

## 2021-11-17 VITALS
HEART RATE: 88 BPM | DIASTOLIC BLOOD PRESSURE: 93 MMHG | RESPIRATION RATE: 20 BRPM | OXYGEN SATURATION: 98 % | SYSTOLIC BLOOD PRESSURE: 161 MMHG

## 2021-11-17 DIAGNOSIS — Z20.822 CLOSE EXPOSURE TO COVID-19 VIRUS: Primary | ICD-10-CM

## 2021-11-17 PROCEDURE — 99212 OFFICE O/P EST SF 10 MIN: CPT

## 2021-11-17 NOTE — ED INITIAL ASSESSMENT (HPI)
Pt here for covid test. Pt was exposed to therapist who tested +covid.  Pt has doctors appt tomorrow

## 2021-11-17 NOTE — ED PROVIDER NOTES
Patient Seen in: [facility] [place]      History   Patient presents with:  Covid-19 Test    Stated Complaint: covid    Subjective:   HPI    CHIEF COMPLAINT: Covid exposure     HISTORY OF PRESENT ILLNESS: Patient is a 32-year-old female who presen Used    Vaping Use      Vaping Use: Never used    Alcohol use: Yes      Alcohol/week: 0.0 standard drinks      Comment: occas, 2 per week, cage score 0    Drug use:  No             Review of Systems    Positive for stated complaint: covid  Other systems are treatment plan. All questions answered.                              Disposition and Plan     Clinical Impression:  Close exposure to COVID-19 virus  (primary encounter diagnosis)     Disposition:  Discharge  11/17/2021  8:41 am    Follow-up:  Sherren Ehrlich

## 2022-01-11 PROBLEM — F43.10 PTSD (POST-TRAUMATIC STRESS DISORDER): Status: ACTIVE | Noted: 2022-01-11

## 2022-08-16 ENCOUNTER — HOSPITAL ENCOUNTER (OUTPATIENT)
Dept: CT IMAGING | Age: 57
Discharge: HOME | End: 2022-08-16
Attending: OTOLARYNGOLOGY

## 2022-08-16 DIAGNOSIS — J32.0 CHRONIC MAXILLARY SINUSITIS: ICD-10-CM

## 2022-08-16 PROCEDURE — 70486 CT MAXILLOFACIAL W/O DYE: CPT

## 2023-01-02 ENCOUNTER — HOSPITAL ENCOUNTER (OUTPATIENT)
Age: 58
Discharge: HOME | End: 2023-01-02
Attending: EMERGENCY MEDICINE
Payer: COMMERCIAL

## 2023-01-02 ENCOUNTER — APPOINTMENT (OUTPATIENT)
Dept: GENERAL RADIOLOGY | Age: 58
End: 2023-01-02
Attending: EMERGENCY MEDICINE
Payer: COMMERCIAL

## 2023-01-02 VITALS
HEART RATE: 75 BPM | BODY MASS INDEX: 22 KG/M2 | SYSTOLIC BLOOD PRESSURE: 154 MMHG | DIASTOLIC BLOOD PRESSURE: 90 MMHG | TEMPERATURE: 97 F | OXYGEN SATURATION: 98 % | WEIGHT: 135 LBS | RESPIRATION RATE: 18 BRPM

## 2023-01-02 DIAGNOSIS — J06.9 VIRAL UPPER RESPIRATORY TRACT INFECTION: Primary | ICD-10-CM

## 2023-01-02 LAB
POCT INFLUENZA A: NEGATIVE
POCT INFLUENZA B: NEGATIVE
SARS-COV-2 RNA RESP QL NAA+PROBE: NOT DETECTED

## 2023-01-02 PROCEDURE — 99214 OFFICE O/P EST MOD 30 MIN: CPT

## 2023-01-02 PROCEDURE — 71046 X-RAY EXAM CHEST 2 VIEWS: CPT | Performed by: EMERGENCY MEDICINE

## 2023-01-02 PROCEDURE — 87502 INFLUENZA DNA AMP PROBE: CPT | Performed by: EMERGENCY MEDICINE

## 2023-01-02 NOTE — ED INITIAL ASSESSMENT (HPI)
Pt states last Tues started with a sore throat, now with congestion and cough. Cough increases when lying down. Denies fever. Denies bodyaches.

## 2023-01-02 NOTE — DISCHARGE INSTRUCTIONS
Use over-the-counter cough medicines and decongestants as needed. Follow-up with your PCP this week. Return if any problems.

## 2023-01-04 ENCOUNTER — HOSPITAL ENCOUNTER (EMERGENCY)
Age: 58
Discharge: HOME | End: 2023-01-04
Attending: STUDENT IN AN ORGANIZED HEALTH CARE EDUCATION/TRAINING PROGRAM
Payer: COMMERCIAL

## 2023-01-04 VITALS
WEIGHT: 136 LBS | BODY MASS INDEX: 22.66 KG/M2 | TEMPERATURE: 99 F | HEIGHT: 65 IN | RESPIRATION RATE: 16 BRPM | SYSTOLIC BLOOD PRESSURE: 160 MMHG | HEART RATE: 75 BPM | OXYGEN SATURATION: 98 % | DIASTOLIC BLOOD PRESSURE: 94 MMHG

## 2023-01-04 DIAGNOSIS — I10 HYPERTENSION, UNSPECIFIED TYPE: Primary | ICD-10-CM

## 2023-01-04 LAB
ALBUMIN SERPL-MCNC: 3.6 G/DL (ref 3.4–5)
ALBUMIN/GLOB SERPL: 1.2 {RATIO} (ref 1–2)
ALP LIVER SERPL-CCNC: 74 U/L
ALT SERPL-CCNC: 30 U/L
ANION GAP SERPL CALC-SCNC: 6 MMOL/L (ref 0–18)
AST SERPL-CCNC: 19 U/L (ref 15–37)
ATRIAL RATE: 67 BPM
BASOPHILS # BLD AUTO: 0.04 X10(3) UL (ref 0–0.2)
BASOPHILS NFR BLD AUTO: 0.5 %
BILIRUB SERPL-MCNC: 0.7 MG/DL (ref 0.1–2)
BUN BLD-MCNC: 14 MG/DL (ref 7–18)
CALCIUM BLD-MCNC: 9.2 MG/DL (ref 8.5–10.1)
CHLORIDE SERPL-SCNC: 109 MMOL/L (ref 98–112)
CO2 SERPL-SCNC: 27 MMOL/L (ref 21–32)
CREAT BLD-MCNC: 0.72 MG/DL
EOSINOPHIL # BLD AUTO: 0.18 X10(3) UL (ref 0–0.7)
EOSINOPHIL NFR BLD AUTO: 2.4 %
ERYTHROCYTE [DISTWIDTH] IN BLOOD BY AUTOMATED COUNT: 12.6 %
GFR SERPLBLD BASED ON 1.73 SQ M-ARVRAT: 98 ML/MIN/1.73M2 (ref 60–?)
GLOBULIN PLAS-MCNC: 3.1 G/DL (ref 2.8–4.4)
GLUCOSE BLD-MCNC: 111 MG/DL (ref 70–99)
HCT VFR BLD AUTO: 40.9 %
HGB BLD-MCNC: 13.9 G/DL
IMM GRANULOCYTES # BLD AUTO: 0.01 X10(3) UL (ref 0–1)
IMM GRANULOCYTES NFR BLD: 0.1 %
LYMPHOCYTES # BLD AUTO: 3.34 X10(3) UL (ref 1–4)
LYMPHOCYTES NFR BLD AUTO: 45.3 %
MCH RBC QN AUTO: 30.7 PG (ref 26–34)
MCHC RBC AUTO-ENTMCNC: 34 G/DL (ref 31–37)
MCV RBC AUTO: 90.3 FL
MONOCYTES # BLD AUTO: 0.44 X10(3) UL (ref 0.1–1)
MONOCYTES NFR BLD AUTO: 6 %
NEUTROPHILS # BLD AUTO: 3.37 X10 (3) UL (ref 1.5–7.7)
NEUTROPHILS # BLD AUTO: 3.37 X10(3) UL (ref 1.5–7.7)
NEUTROPHILS NFR BLD AUTO: 45.7 %
OSMOLALITY SERPL CALC.SUM OF ELEC: 295 MOSM/KG (ref 275–295)
P AXIS: 37 DEGREES
P-R INTERVAL: 144 MS
PLATELET # BLD AUTO: 253 10(3)UL (ref 150–450)
POTASSIUM SERPL-SCNC: 3.9 MMOL/L (ref 3.5–5.1)
PROT SERPL-MCNC: 6.7 G/DL (ref 6.4–8.2)
Q-T INTERVAL: 432 MS
QRS DURATION: 86 MS
QTC CALCULATION (BEZET): 456 MS
R AXIS: -22 DEGREES
RBC # BLD AUTO: 4.53 X10(6)UL
SODIUM SERPL-SCNC: 142 MMOL/L (ref 136–145)
T AXIS: 10 DEGREES
TROPONIN I HIGH SENSITIVITY: 9 NG/L
VENTRICULAR RATE: 67 BPM
WBC # BLD AUTO: 7.4 X10(3) UL (ref 4–11)

## 2023-01-04 PROCEDURE — 93005 ELECTROCARDIOGRAM TRACING: CPT

## 2023-01-04 PROCEDURE — 84484 ASSAY OF TROPONIN QUANT: CPT | Performed by: STUDENT IN AN ORGANIZED HEALTH CARE EDUCATION/TRAINING PROGRAM

## 2023-01-04 PROCEDURE — 80053 COMPREHEN METABOLIC PANEL: CPT | Performed by: STUDENT IN AN ORGANIZED HEALTH CARE EDUCATION/TRAINING PROGRAM

## 2023-01-04 PROCEDURE — 93010 ELECTROCARDIOGRAM REPORT: CPT

## 2023-01-04 PROCEDURE — 99284 EMERGENCY DEPT VISIT MOD MDM: CPT

## 2023-01-04 PROCEDURE — 99283 EMERGENCY DEPT VISIT LOW MDM: CPT

## 2023-01-04 PROCEDURE — 85025 COMPLETE CBC W/AUTO DIFF WBC: CPT

## 2023-01-04 PROCEDURE — 84484 ASSAY OF TROPONIN QUANT: CPT

## 2023-01-04 PROCEDURE — 80053 COMPREHEN METABOLIC PANEL: CPT

## 2023-01-04 PROCEDURE — 85025 COMPLETE CBC W/AUTO DIFF WBC: CPT | Performed by: STUDENT IN AN ORGANIZED HEALTH CARE EDUCATION/TRAINING PROGRAM

## 2023-01-04 PROCEDURE — 36415 COLL VENOUS BLD VENIPUNCTURE: CPT

## 2023-01-13 ENCOUNTER — HOSPITAL ENCOUNTER (OUTPATIENT)
Dept: MAMMOGRAPHY | Age: 58
Discharge: HOME | End: 2023-01-13
Attending: NURSE PRACTITIONER
Payer: COMMERCIAL

## 2023-01-13 DIAGNOSIS — Z12.31 ENCOUNTER FOR SCREENING MAMMOGRAM FOR MALIGNANT NEOPLASM OF BREAST: ICD-10-CM

## 2023-01-13 PROCEDURE — 77063 BREAST TOMOSYNTHESIS BI: CPT | Performed by: NURSE PRACTITIONER

## 2023-01-13 PROCEDURE — 77067 SCR MAMMO BI INCL CAD: CPT | Performed by: NURSE PRACTITIONER

## 2023-03-07 ENCOUNTER — ORDER TRANSCRIPTION (OUTPATIENT)
Dept: ADMINISTRATIVE | Facility: HOSPITAL | Age: 58
End: 2023-03-07

## 2023-03-07 DIAGNOSIS — Z11.59 ENCOUNTER FOR SCREENING FOR OTHER VIRAL DISEASES: ICD-10-CM

## 2023-03-07 DIAGNOSIS — Z01.818 PRE-OP TESTING: Primary | ICD-10-CM

## 2023-03-22 ENCOUNTER — HOSPITAL ENCOUNTER (OUTPATIENT)
Dept: CV DIAGNOSTICS | Age: 58
Discharge: HOME | End: 2023-03-22
Attending: NURSE PRACTITIONER
Payer: COMMERCIAL

## 2023-03-22 DIAGNOSIS — I10 PRIMARY HYPERTENSION: ICD-10-CM

## 2023-03-22 PROCEDURE — 93306 TTE W/DOPPLER COMPLETE: CPT | Performed by: NURSE PRACTITIONER

## 2023-09-18 ENCOUNTER — HOSPITAL ENCOUNTER (EMERGENCY)
Facility: HOSPITAL | Age: 58
Discharge: HOME | End: 2023-09-18
Attending: EMERGENCY MEDICINE
Payer: COMMERCIAL

## 2023-09-18 VITALS
OXYGEN SATURATION: 100 % | SYSTOLIC BLOOD PRESSURE: 176 MMHG | RESPIRATION RATE: 20 BRPM | WEIGHT: 131 LBS | BODY MASS INDEX: 21.83 KG/M2 | TEMPERATURE: 99 F | DIASTOLIC BLOOD PRESSURE: 93 MMHG | HEIGHT: 65 IN | HEART RATE: 61 BPM

## 2023-09-18 DIAGNOSIS — F43.9 SITUATIONAL STRESS: ICD-10-CM

## 2023-09-18 DIAGNOSIS — I10 PRIMARY HYPERTENSION: Primary | ICD-10-CM

## 2023-09-18 DIAGNOSIS — R42 LIGHTHEADEDNESS: ICD-10-CM

## 2023-09-18 DIAGNOSIS — R03.0 ELEVATED BLOOD PRESSURE READING: ICD-10-CM

## 2023-09-18 LAB
ALBUMIN SERPL-MCNC: 3.7 G/DL (ref 3.4–5)
ALBUMIN/GLOB SERPL: 1.1 {RATIO} (ref 1–2)
ALP LIVER SERPL-CCNC: 74 U/L
ALT SERPL-CCNC: 35 U/L
ANION GAP SERPL CALC-SCNC: 4 MMOL/L (ref 0–18)
AST SERPL-CCNC: 30 U/L (ref 15–37)
ATRIAL RATE: 66 BPM
BASOPHILS # BLD AUTO: 0.03 X10(3) UL (ref 0–0.2)
BASOPHILS NFR BLD AUTO: 0.4 %
BILIRUB SERPL-MCNC: 0.7 MG/DL (ref 0.1–2)
BUN BLD-MCNC: 15 MG/DL (ref 7–18)
CALCIUM BLD-MCNC: 9.5 MG/DL (ref 8.5–10.1)
CHLORIDE SERPL-SCNC: 109 MMOL/L (ref 98–112)
CO2 SERPL-SCNC: 27 MMOL/L (ref 21–32)
CREAT BLD-MCNC: 0.72 MG/DL
EGFRCR SERPLBLD CKD-EPI 2021: 97 ML/MIN/1.73M2 (ref 60–?)
EOSINOPHIL # BLD AUTO: 0.08 X10(3) UL (ref 0–0.7)
EOSINOPHIL NFR BLD AUTO: 1.2 %
ERYTHROCYTE [DISTWIDTH] IN BLOOD BY AUTOMATED COUNT: 12.3 %
GLOBULIN PLAS-MCNC: 3.3 G/DL (ref 2.8–4.4)
GLUCOSE BLD-MCNC: 103 MG/DL (ref 70–99)
HCT VFR BLD AUTO: 42.7 %
HGB BLD-MCNC: 14.4 G/DL
IMM GRANULOCYTES # BLD AUTO: 0.01 X10(3) UL (ref 0–1)
IMM GRANULOCYTES NFR BLD: 0.1 %
LYMPHOCYTES # BLD AUTO: 2.76 X10(3) UL (ref 1–4)
LYMPHOCYTES NFR BLD AUTO: 40.5 %
MCH RBC QN AUTO: 30.5 PG (ref 26–34)
MCHC RBC AUTO-ENTMCNC: 33.7 G/DL (ref 31–37)
MCV RBC AUTO: 90.5 FL
MONOCYTES # BLD AUTO: 0.47 X10(3) UL (ref 0.1–1)
MONOCYTES NFR BLD AUTO: 6.9 %
NEUTROPHILS # BLD AUTO: 3.46 X10 (3) UL (ref 1.5–7.7)
NEUTROPHILS # BLD AUTO: 3.46 X10(3) UL (ref 1.5–7.7)
NEUTROPHILS NFR BLD AUTO: 50.9 %
OSMOLALITY SERPL CALC.SUM OF ELEC: 291 MOSM/KG (ref 275–295)
P AXIS: 32 DEGREES
P-R INTERVAL: 154 MS
PLATELET # BLD AUTO: 235 10(3)UL (ref 150–450)
POTASSIUM SERPL-SCNC: 3.8 MMOL/L (ref 3.5–5.1)
PROT SERPL-MCNC: 7 G/DL (ref 6.4–8.2)
Q-T INTERVAL: 426 MS
QRS DURATION: 78 MS
QTC CALCULATION (BEZET): 446 MS
R AXIS: -24 DEGREES
RBC # BLD AUTO: 4.72 X10(6)UL
SODIUM SERPL-SCNC: 140 MMOL/L (ref 136–145)
T AXIS: 27 DEGREES
TROPONIN I HIGH SENSITIVITY: 6 NG/L
VENTRICULAR RATE: 66 BPM
WBC # BLD AUTO: 6.8 X10(3) UL (ref 4–11)

## 2023-09-18 PROCEDURE — 85025 COMPLETE CBC W/AUTO DIFF WBC: CPT

## 2023-09-18 PROCEDURE — 93010 ELECTROCARDIOGRAM REPORT: CPT

## 2023-09-18 PROCEDURE — 99283 EMERGENCY DEPT VISIT LOW MDM: CPT

## 2023-09-18 PROCEDURE — 93005 ELECTROCARDIOGRAM TRACING: CPT

## 2023-09-18 PROCEDURE — 80053 COMPREHEN METABOLIC PANEL: CPT | Performed by: EMERGENCY MEDICINE

## 2023-09-18 PROCEDURE — 36415 COLL VENOUS BLD VENIPUNCTURE: CPT

## 2023-09-18 PROCEDURE — 99284 EMERGENCY DEPT VISIT MOD MDM: CPT

## 2023-09-18 PROCEDURE — 84484 ASSAY OF TROPONIN QUANT: CPT | Performed by: EMERGENCY MEDICINE

## 2023-09-18 PROCEDURE — 80053 COMPREHEN METABOLIC PANEL: CPT

## 2023-09-18 PROCEDURE — 85025 COMPLETE CBC W/AUTO DIFF WBC: CPT | Performed by: EMERGENCY MEDICINE

## 2023-09-18 PROCEDURE — 84484 ASSAY OF TROPONIN QUANT: CPT

## 2023-09-18 NOTE — ED INITIAL ASSESSMENT (HPI)
Brought by medic   with complaints of pressure in the head and lightheadedness since morning . Patient is known hypertensive on regular medications . Denies chest pain .

## 2023-09-18 NOTE — DISCHARGE INSTRUCTIONS
Keep a log of her blood pressures at home and bring it to the appointments with your cardiologist and primary care physician.

## 2024-01-30 ENCOUNTER — HOSPITAL ENCOUNTER (OUTPATIENT)
Age: 59
Discharge: HOME | End: 2024-01-30
Payer: COMMERCIAL

## 2024-01-30 ENCOUNTER — APPOINTMENT (OUTPATIENT)
Dept: CT IMAGING | Age: 59
End: 2024-01-30
Attending: NURSE PRACTITIONER
Payer: COMMERCIAL

## 2024-01-30 VITALS
WEIGHT: 129 LBS | HEART RATE: 76 BPM | SYSTOLIC BLOOD PRESSURE: 140 MMHG | BODY MASS INDEX: 22.86 KG/M2 | TEMPERATURE: 98 F | DIASTOLIC BLOOD PRESSURE: 90 MMHG | HEIGHT: 63 IN | RESPIRATION RATE: 18 BRPM | OXYGEN SATURATION: 98 %

## 2024-01-30 DIAGNOSIS — R10.9 ABDOMINAL PAIN OF UNKNOWN ETIOLOGY: Primary | ICD-10-CM

## 2024-01-30 LAB
#MXD IC: 0.5 X10ˆ3/UL (ref 0.1–1)
BILIRUB UR QL STRIP: NEGATIVE
BUN BLD-MCNC: 11 MG/DL (ref 7–18)
CHLORIDE BLD-SCNC: 103 MMOL/L (ref 98–112)
CLARITY UR: CLEAR
CO2 BLD-SCNC: 27 MMOL/L (ref 21–32)
COLOR UR: YELLOW
CREAT BLD-MCNC: 0.6 MG/DL
EGFRCR SERPLBLD CKD-EPI 2021: 105 ML/MIN/1.73M2 (ref 60–?)
GLUCOSE BLD-MCNC: 153 MG/DL (ref 70–99)
GLUCOSE UR STRIP-MCNC: NEGATIVE MG/DL
HCT VFR BLD AUTO: 44 %
HCT VFR BLD CALC: 43 %
HGB BLD-MCNC: 14.7 G/DL
ISTAT IONIZED CALCIUM FOR CHEM 8: 1.19 MMOL/L (ref 1.12–1.32)
LIPASE SERPL-CCNC: 61 U/L (ref ?–300)
LYMPHOCYTES # BLD AUTO: 1.8 X10ˆ3/UL (ref 1–4)
LYMPHOCYTES NFR BLD AUTO: 26.6 %
MCH RBC QN AUTO: 30.9 PG (ref 26–34)
MCHC RBC AUTO-ENTMCNC: 33.4 G/DL (ref 31–37)
MCV RBC AUTO: 92.6 FL (ref 80–100)
MIXED CELL %: 7.3 %
NEUTROPHILS # BLD AUTO: 4.5 X10ˆ3/UL (ref 1.5–7.7)
NEUTROPHILS NFR BLD AUTO: 66.1 %
NITRITE UR QL STRIP: NEGATIVE
PH UR STRIP: 7 [PH]
PLATELET # BLD AUTO: 246 X10ˆ3/UL (ref 150–450)
POTASSIUM BLD-SCNC: 3.6 MMOL/L (ref 3.6–5.1)
PROT UR STRIP-MCNC: 30 MG/DL
RBC # BLD AUTO: 4.75 X10ˆ6/UL
SODIUM BLD-SCNC: 141 MMOL/L (ref 136–145)
SP GR UR STRIP: 1.02
UROBILINOGEN UR STRIP-ACNC: <2 MG/DL
WBC # BLD AUTO: 6.8 X10ˆ3/UL (ref 4–11)

## 2024-01-30 PROCEDURE — 81002 URINALYSIS NONAUTO W/O SCOPE: CPT

## 2024-01-30 PROCEDURE — 74177 CT ABD & PELVIS W/CONTRAST: CPT | Performed by: NURSE PRACTITIONER

## 2024-01-30 PROCEDURE — 99204 OFFICE O/P NEW MOD 45 MIN: CPT

## 2024-01-30 PROCEDURE — 99215 OFFICE O/P EST HI 40 MIN: CPT

## 2024-01-30 PROCEDURE — 80047 BASIC METABLC PNL IONIZED CA: CPT

## 2024-01-30 PROCEDURE — 83690 ASSAY OF LIPASE: CPT | Performed by: NURSE PRACTITIONER

## 2024-01-30 PROCEDURE — 85025 COMPLETE CBC W/AUTO DIFF WBC: CPT | Performed by: NURSE PRACTITIONER

## 2024-01-30 PROCEDURE — 36415 COLL VENOUS BLD VENIPUNCTURE: CPT

## 2024-01-30 NOTE — DISCHARGE INSTRUCTIONS
Please take your pantoprazole every day.  Avoid anti-inflammatory type medications, caffeine, alcohol or tobacco.  Avoid eating late at night and do not lie down shortly after mealtime.  [place] diet recommended.    Go to the Emergency Department if your symptoms worsen or if you develop increased pain, vomiting blood or coffee-ground looking material, if you develop dark or black stools, abdominal pain, fever, weakness.

## 2024-01-30 NOTE — ED INITIAL ASSESSMENT (HPI)
LUQ pain accompanied by left side back pain since Sunday.  Denies any fever, nausea/ vomiting, diarrhea. Pt concerned with diverticulitis flare up.

## 2024-01-30 NOTE — ED PROVIDER NOTES
Patient Seen in: [facility]      History     Chief Complaint   Patient presents with    Abdominal Pain     Stated Complaint: Abdominal Pain    Subjective:   HPI  Patient is a 58-year-old female with hypertension, GERD, hyperlipidemia, diverticulitis presents with abdominal pain that started this past Sunday.  Patient points to left upper region of abdomen when describing the pain but states that it radiates to the left mid region left lower quadrant of the abdomen pain is described as a pressure for and dull pain but is sometimes stabbing in nature.  Pain will sometimes radiate to her left low back area.  Pain presently is rated 4-5 out of 10.  She reports she does have a history of chronic left low back pain secondary to her scoliosis but feels that this abdominal pain may be aggravating her back pain.  Eating food seems to exacerbate this pain and placing a heating pad on her abdomen will sometimes help alleviate the discomfort.  Patient has history of diverticulitis 3 years ago that was treated with Levaquin and metronidazole.  She states that the antibiotics did not agree with her.  Her last colonoscopy was approximately 3 years ago and did show colon polyps.  She does take pantoprazole for her GERD.  Denies any history of pancreatitis.  She drinks few glasses of wine per week.  She is currently receiving steroid trigger point injections for her scoliosis and states that these injections caused some abdominal discomfort and gastritis.  Her most recent steroid injection was done on January 11.  She did have a small amount to eat this morning.  She denies any urinary symptoms.  Last BM was this morning and reported as normal.  Denies melena or hematochezia.  Denies nausea or vomiting.  Denies history of ureteral calculi.  Denies trauma.  Denies history of abdominal surgery.  Denies history of coronary artery disease.                Objective:   Past Medical History:   Diagnosis Date     Diverticulitis     GERD (gastroesophageal reflux disease)     Hyperlipidemia     Scoliosis     Unspecified essential hypertension               Past Surgical History:   Procedure Laterality Date    EGD N/A 6/16/2021    Procedure: ESOPHAGOGASTRODUODENOSCOPY w/ bxs, COLONOSCOPY,;  Surgeon: Clovis Parsons MD;  Location: [facility], [facility]                Social History     Socioeconomic History    Marital status:     Number of children: 2   Occupational History    Occupation:    Tobacco Use    Smoking status: Never    Smokeless tobacco: Never   Vaping Use    Vaping Use: Never used   Substance and Sexual Activity    Alcohol use: Yes     Alcohol/week: 0.0 standard drinks of alcohol     Comment: occas, 2 per week, cage score 0    Drug use: No    Sexual activity: Yes     Partners: Male   Other Topics Concern    Caffeine Concern Yes    Occupational Exposure No    Hobby Hazards No    Sleep Concern Yes    Stress Concern Yes    Weight Concern No    Special Diet Yes    Back Care No    Exercise No    Seat Belt Yes              Review of Systems    Positive for stated complaint: Abdominal Pain  Other systems are as noted in HPI.  Constitutional and vital signs reviewed.      All other systems reviewed and negative except as noted above.    Physical Exam     ED Triage Vitals [01/30/24 0854]   BP (!) 155/101   Pulse 81   Resp 18   Temp 97.9 °F (36.6 °C)   Temp src Temporal   SpO2 98 %   O2 Device None (Room air)       Current:/90   Pulse 76   Temp 97.9 °F (36.6 °C) (Temporal)   Resp 18   Ht 160 cm (5' 3\")   Wt 58.5 kg   LMP 08/20/2015 (Approximate)   SpO2 98%   BMI 22.85 kg/m²         Physical Exam  Vitals and nursing note reviewed.   Constitutional:       General: She is not in acute distress.     Appearance: Normal appearance. She is well-developed. She is not ill-appearing, toxic-appearing or diaphoretic.   HENT:      Mouth/Throat:      Mouth: Mucous membranes are moist.   Eyes:       Conjunctiva/sclera: Conjunctivae normal.   Cardiovascular:      Rate and Rhythm: Normal rate and regular rhythm.      Pulses: Normal pulses.      Heart sounds: Normal heart sounds. No murmur heard.     No friction rub. No gallop.   Pulmonary:      Effort: Pulmonary effort is normal. No respiratory distress.      Breath sounds: Normal breath sounds. No stridor. No wheezing, rhonchi or rales.   Chest:      Chest wall: No tenderness.   Abdominal:      General: Bowel sounds are normal. There is no distension.      Palpations: Abdomen is soft. There is no mass.      Tenderness: There is abdominal tenderness (Left upper quadrant, left mid abdominal region.). There is no right CVA tenderness, left CVA tenderness, guarding or rebound.      Hernia: No hernia is present.   Skin:     General: Skin is warm and dry.      Capillary Refill: Capillary refill takes less than 2 seconds.      Coloration: Skin is not pale.      Findings: No erythema or rash.   Neurological:      Mental Status: She is alert and oriented to person, place, and time.   Psychiatric:         Mood and Affect: Mood normal.         Behavior: Behavior normal.                 ED Course     Labs Reviewed   [facility] POCT URINALYSIS DIPSTICK - Abnormal; Notable for the following components:       Result Value    Protein urine 30 (*)     Ketone, Urine Trace (*)     Blood, Urine Trace-Intact (*)     Leukocyte esterase urine Trace (*)     All other components within normal limits   POCT ISTAT CHEM8 CARTRIDGE - Abnormal; Notable for the following components:    ISTAT Glucose 153 (*)     All other components within normal limits   LIPASE   POCT CBC                        MDM   58-year-old female presents with left-sided abdominal pain over the past few days.  History of diverticulitis.  Differential; diverticulitis, gastritis, colitis, pancreatitis, other acute intra-abdominal disorder  CBC; normal  I stat Chem-8; glucose 153  Lipase result is pending.    Urinalysis dipstick;  trace leukocyte esterase, trace blood, trace ketones, 30 protein  CT abdomen and pelvis; without any acute abdominal pelvic process identified.  There is diverticulosis without diverticulitis and cholelithiasis without ductal dilatation      Results discussed.  No acute intra-abdominal process identified.  She may have gastritis or even an ulcer.  She does have pantoprazole at home and is taking it every other day but she will increase this to daily use.  She will follow-up with her GI physician.  Culpeper diet.  Avoid alcohol.  She is to seek immediate medical attention if her symptoms worsen.  She is agreeable with this plan.    Urinalysis dipstick positive for trace leukocyte esterase and trace blood.  Patient does not have any urinary symptoms.  Follow-up with PCP    Patient declined pain medication while in our facility.        Patient case discussed with Dr. Jett                   Medical Decision Making      Disposition and Plan     Clinical Impression:  1. Abdominal pain of unknown etiology         Disposition:  Discharge  1/30/2024 11:26 am    Follow-up:  Follow-up with your gastroenterologist                Medications Prescribed:  Current Discharge Medication List

## 2024-02-04 ENCOUNTER — HOSPITAL ENCOUNTER (OUTPATIENT)
Facility: HOSPITAL | Age: 59
Setting detail: OBSERVATION
Discharge: HOME | End: 2024-02-05
Attending: EMERGENCY MEDICINE | Admitting: INTERNAL MEDICINE
Payer: COMMERCIAL

## 2024-02-04 ENCOUNTER — APPOINTMENT (OUTPATIENT)
Dept: ULTRASOUND IMAGING | Facility: HOSPITAL | Age: 59
End: 2024-02-04
Attending: EMERGENCY MEDICINE
Payer: COMMERCIAL

## 2024-02-04 ENCOUNTER — HOSPITAL ENCOUNTER (INPATIENT)
Facility: HOSPITAL | Age: 59
LOS: 1 days | Discharge: HOME | End: 2024-02-05
Attending: EMERGENCY MEDICINE | Admitting: INTERNAL MEDICINE
Payer: COMMERCIAL

## 2024-02-04 DIAGNOSIS — K80.20 CALCULUS OF GALLBLADDER WITHOUT CHOLECYSTITIS WITHOUT OBSTRUCTION: Primary | ICD-10-CM

## 2024-02-04 DIAGNOSIS — K81.9 CHOLECYSTITIS: ICD-10-CM

## 2024-02-04 LAB
ALBUMIN SERPL-MCNC: 3.8 G/DL (ref 3.4–5)
ALBUMIN/GLOB SERPL: 1.3 {RATIO} (ref 1–2)
ALP LIVER SERPL-CCNC: 60 U/L
ALT SERPL-CCNC: 35 U/L
ANION GAP SERPL CALC-SCNC: 3 MMOL/L (ref 0–18)
AST SERPL-CCNC: 21 U/L (ref 15–37)
BASOPHILS # BLD AUTO: 0.05 X10(3) UL (ref 0–0.2)
BASOPHILS NFR BLD AUTO: 0.7 %
BILIRUB SERPL-MCNC: 0.5 MG/DL (ref 0.1–2)
BUN BLD-MCNC: 14 MG/DL (ref 9–23)
CALCIUM BLD-MCNC: 9.2 MG/DL (ref 8.5–10.1)
CHLORIDE SERPL-SCNC: 110 MMOL/L (ref 98–112)
CO2 SERPL-SCNC: 28 MMOL/L (ref 21–32)
CREAT BLD-MCNC: 0.66 MG/DL
EGFRCR SERPLBLD CKD-EPI 2021: 102 ML/MIN/1.73M2 (ref 60–?)
EOSINOPHIL # BLD AUTO: 0.12 X10(3) UL (ref 0–0.7)
EOSINOPHIL NFR BLD AUTO: 1.6 %
ERYTHROCYTE [DISTWIDTH] IN BLOOD BY AUTOMATED COUNT: 12.6 %
GLOBULIN PLAS-MCNC: 3 G/DL (ref 2.8–4.4)
GLUCOSE BLD-MCNC: 112 MG/DL (ref 70–99)
HCT VFR BLD AUTO: 43 %
HGB BLD-MCNC: 14.4 G/DL
IMM GRANULOCYTES # BLD AUTO: 0.02 X10(3) UL (ref 0–1)
IMM GRANULOCYTES NFR BLD: 0.3 %
LIPASE SERPL-CCNC: 108 U/L (ref 13–75)
LYMPHOCYTES # BLD AUTO: 3.18 X10(3) UL (ref 1–4)
LYMPHOCYTES NFR BLD AUTO: 42.5 %
MCH RBC QN AUTO: 31.4 PG (ref 26–34)
MCHC RBC AUTO-ENTMCNC: 33.5 G/DL (ref 31–37)
MCV RBC AUTO: 93.7 FL
MONOCYTES # BLD AUTO: 0.68 X10(3) UL (ref 0.1–1)
MONOCYTES NFR BLD AUTO: 9.1 %
NEUTROPHILS # BLD AUTO: 3.43 X10 (3) UL (ref 1.5–7.7)
NEUTROPHILS # BLD AUTO: 3.43 X10(3) UL (ref 1.5–7.7)
NEUTROPHILS NFR BLD AUTO: 45.8 %
OSMOLALITY SERPL CALC.SUM OF ELEC: 293 MOSM/KG (ref 275–295)
PLATELET # BLD AUTO: 247 10(3)UL (ref 150–450)
POTASSIUM SERPL-SCNC: 3.9 MMOL/L (ref 3.5–5.1)
PROT SERPL-MCNC: 6.8 G/DL (ref 6.4–8.2)
RBC # BLD AUTO: 4.59 X10(6)UL
SODIUM SERPL-SCNC: 141 MMOL/L (ref 136–145)
TROPONIN I SERPL HS-MCNC: 5 NG/L
WBC # BLD AUTO: 7.5 X10(3) UL (ref 4–11)

## 2024-02-04 PROCEDURE — 84484 ASSAY OF TROPONIN QUANT: CPT | Performed by: EMERGENCY MEDICINE

## 2024-02-04 PROCEDURE — 76700 US EXAM ABDOM COMPLETE: CPT | Performed by: EMERGENCY MEDICINE

## 2024-02-04 PROCEDURE — 80053 COMPREHEN METABOLIC PANEL: CPT

## 2024-02-04 PROCEDURE — 83690 ASSAY OF LIPASE: CPT | Performed by: EMERGENCY MEDICINE

## 2024-02-04 PROCEDURE — 80053 COMPREHEN METABOLIC PANEL: CPT | Performed by: EMERGENCY MEDICINE

## 2024-02-04 PROCEDURE — 84484 ASSAY OF TROPONIN QUANT: CPT

## 2024-02-04 PROCEDURE — 76705 ECHO EXAM OF ABDOMEN: CPT | Performed by: EMERGENCY MEDICINE

## 2024-02-04 PROCEDURE — 83690 ASSAY OF LIPASE: CPT

## 2024-02-04 PROCEDURE — 93010 ELECTROCARDIOGRAM REPORT: CPT

## 2024-02-04 PROCEDURE — 96360 HYDRATION IV INFUSION INIT: CPT

## 2024-02-04 PROCEDURE — 99285 EMERGENCY DEPT VISIT HI MDM: CPT

## 2024-02-04 PROCEDURE — 85025 COMPLETE CBC W/AUTO DIFF WBC: CPT | Performed by: EMERGENCY MEDICINE

## 2024-02-04 PROCEDURE — 93005 ELECTROCARDIOGRAM TRACING: CPT

## 2024-02-04 PROCEDURE — 85025 COMPLETE CBC W/AUTO DIFF WBC: CPT

## 2024-02-04 RX ORDER — SODIUM CHLORIDE 9 MG/ML
INJECTION, SOLUTION INTRAVENOUS CONTINUOUS
Status: DISCONTINUED | OUTPATIENT
Start: 2024-02-04 | End: 2024-02-05

## 2024-02-05 ENCOUNTER — ANESTHESIA EVENT (OUTPATIENT)
Dept: SURGERY | Facility: HOSPITAL | Age: 59
End: 2024-02-05
Payer: COMMERCIAL

## 2024-02-05 ENCOUNTER — ANESTHESIA (OUTPATIENT)
Dept: SURGERY | Facility: HOSPITAL | Age: 59
End: 2024-02-05
Payer: COMMERCIAL

## 2024-02-05 VITALS
HEART RATE: 57 BPM | RESPIRATION RATE: 18 BRPM | WEIGHT: 130 LBS | DIASTOLIC BLOOD PRESSURE: 81 MMHG | BODY MASS INDEX: 23.04 KG/M2 | SYSTOLIC BLOOD PRESSURE: 159 MMHG | OXYGEN SATURATION: 92 % | TEMPERATURE: 99 F | HEIGHT: 63 IN

## 2024-02-05 LAB
ALBUMIN SERPL-MCNC: 3.4 G/DL (ref 3.4–5)
ALBUMIN/GLOB SERPL: 1.2 {RATIO} (ref 1–2)
ALP LIVER SERPL-CCNC: 55 U/L
ALT SERPL-CCNC: 30 U/L
ANION GAP SERPL CALC-SCNC: 0 MMOL/L (ref 0–18)
AST SERPL-CCNC: 21 U/L (ref 15–37)
ATRIAL RATE: 64 BPM
BILIRUB SERPL-MCNC: 0.9 MG/DL (ref 0.1–2)
BUN BLD-MCNC: 8 MG/DL (ref 9–23)
CALCIUM BLD-MCNC: 8.7 MG/DL (ref 8.5–10.1)
CHLORIDE SERPL-SCNC: 114 MMOL/L (ref 98–112)
CO2 SERPL-SCNC: 29 MMOL/L (ref 21–32)
CREAT BLD-MCNC: 0.55 MG/DL
EGFRCR SERPLBLD CKD-EPI 2021: 107 ML/MIN/1.73M2 (ref 60–?)
ERYTHROCYTE [DISTWIDTH] IN BLOOD BY AUTOMATED COUNT: 12.7 %
GLOBULIN PLAS-MCNC: 2.9 G/DL (ref 2.8–4.4)
GLUCOSE BLD-MCNC: 107 MG/DL (ref 70–99)
HCT VFR BLD AUTO: 41.8 %
HGB BLD-MCNC: 13.8 G/DL
LIPASE SERPL-CCNC: 56 U/L (ref ?–300)
MCH RBC QN AUTO: 30.9 PG (ref 26–34)
MCHC RBC AUTO-ENTMCNC: 33 G/DL (ref 31–37)
MCV RBC AUTO: 93.7 FL
OSMOLALITY SERPL CALC.SUM OF ELEC: 295 MOSM/KG (ref 275–295)
P AXIS: 36 DEGREES
P-R INTERVAL: 160 MS
PLATELET # BLD AUTO: 224 10(3)UL (ref 150–450)
POTASSIUM SERPL-SCNC: 4.4 MMOL/L (ref 3.5–5.1)
PROT SERPL-MCNC: 6.3 G/DL (ref 6.4–8.2)
Q-T INTERVAL: 422 MS
QRS DURATION: 78 MS
QTC CALCULATION (BEZET): 435 MS
R AXIS: -24 DEGREES
RBC # BLD AUTO: 4.46 X10(6)UL
SODIUM SERPL-SCNC: 143 MMOL/L (ref 136–145)
T AXIS: 26 DEGREES
VENTRICULAR RATE: 64 BPM
WBC # BLD AUTO: 6.3 X10(3) UL (ref 4–11)

## 2024-02-05 PROCEDURE — 80053 COMPREHEN METABOLIC PANEL: CPT | Performed by: PHYSICIAN ASSISTANT

## 2024-02-05 PROCEDURE — 85027 COMPLETE CBC AUTOMATED: CPT | Performed by: PHYSICIAN ASSISTANT

## 2024-02-05 PROCEDURE — 83690 ASSAY OF LIPASE: CPT | Performed by: PHYSICIAN ASSISTANT

## 2024-02-05 PROCEDURE — 88304 TISSUE EXAM BY PATHOLOGIST: CPT | Performed by: SURGERY

## 2024-02-05 PROCEDURE — 0FT44ZZ RESECTION OF GALLBLADDER, PERCUTANEOUS ENDOSCOPIC APPROACH: ICD-10-PCS | Performed by: SURGERY

## 2024-02-05 RX ORDER — SODIUM CHLORIDE 9 MG/ML
INJECTION, SOLUTION INTRAVENOUS CONTINUOUS
Status: DISCONTINUED | OUTPATIENT
Start: 2024-02-05 | End: 2024-02-05

## 2024-02-05 RX ORDER — PANTOPRAZOLE SODIUM 20 MG/1
20 TABLET, DELAYED RELEASE ORAL
Status: DISCONTINUED | OUTPATIENT
Start: 2024-02-05 | End: 2024-02-05

## 2024-02-05 RX ORDER — HYDROMORPHONE HYDROCHLORIDE 1 MG/ML
0.4 INJECTION, SOLUTION INTRAMUSCULAR; INTRAVENOUS; SUBCUTANEOUS EVERY 5 MIN PRN
Status: DISCONTINUED | OUTPATIENT
Start: 2024-02-05 | End: 2024-02-05 | Stop reason: HOSPADM

## 2024-02-05 RX ORDER — PROCHLORPERAZINE EDISYLATE 5 MG/ML
5 INJECTION INTRAMUSCULAR; INTRAVENOUS EVERY 8 HOURS PRN
Status: DISCONTINUED | OUTPATIENT
Start: 2024-02-05 | End: 2024-02-05 | Stop reason: HOSPADM

## 2024-02-05 RX ORDER — SODIUM CHLORIDE, SODIUM LACTATE, POTASSIUM CHLORIDE, CALCIUM CHLORIDE 600; 310; 30; 20 MG/100ML; MG/100ML; MG/100ML; MG/100ML
INJECTION, SOLUTION INTRAVENOUS CONTINUOUS
Status: DISCONTINUED | OUTPATIENT
Start: 2024-02-05 | End: 2024-02-05 | Stop reason: HOSPADM

## 2024-02-05 RX ORDER — ONDANSETRON 2 MG/ML
INJECTION INTRAMUSCULAR; INTRAVENOUS AS NEEDED
Status: DISCONTINUED | OUTPATIENT
Start: 2024-02-05 | End: 2024-02-05 | Stop reason: SURG

## 2024-02-05 RX ORDER — ONDANSETRON 2 MG/ML
4 INJECTION INTRAMUSCULAR; INTRAVENOUS EVERY 6 HOURS PRN
Status: DISCONTINUED | OUTPATIENT
Start: 2024-02-05 | End: 2024-02-05 | Stop reason: HOSPADM

## 2024-02-05 RX ORDER — HYDROMORPHONE HYDROCHLORIDE 1 MG/ML
0.6 INJECTION, SOLUTION INTRAMUSCULAR; INTRAVENOUS; SUBCUTANEOUS EVERY 5 MIN PRN
Status: DISCONTINUED | OUTPATIENT
Start: 2024-02-05 | End: 2024-02-05 | Stop reason: HOSPADM

## 2024-02-05 RX ORDER — DEXAMETHASONE SODIUM PHOSPHATE 4 MG/ML
VIAL (ML) INJECTION AS NEEDED
Status: DISCONTINUED | OUTPATIENT
Start: 2024-02-05 | End: 2024-02-05 | Stop reason: SURG

## 2024-02-05 RX ORDER — LIDOCAINE HYDROCHLORIDE ANHYDROUS AND DEXTROSE MONOHYDRATE .8; 5 G/100ML; G/100ML
INJECTION, SOLUTION INTRAVENOUS CONTINUOUS PRN
Status: DISCONTINUED | OUTPATIENT
Start: 2024-02-05 | End: 2024-02-05 | Stop reason: SURG

## 2024-02-05 RX ORDER — HYDRALAZINE HYDROCHLORIDE 20 MG/ML
5 INJECTION INTRAMUSCULAR; INTRAVENOUS
Status: DISCONTINUED | OUTPATIENT
Start: 2024-02-05 | End: 2024-02-05 | Stop reason: HOSPADM

## 2024-02-05 RX ORDER — BISACODYL 10 MG
10 SUPPOSITORY, RECTAL RECTAL
Status: DISCONTINUED | OUTPATIENT
Start: 2024-02-05 | End: 2024-02-05

## 2024-02-05 RX ORDER — MORPHINE SULFATE 2 MG/ML
2 INJECTION, SOLUTION INTRAMUSCULAR; INTRAVENOUS EVERY 2 HOUR PRN
Status: DISCONTINUED | OUTPATIENT
Start: 2024-02-05 | End: 2024-02-05

## 2024-02-05 RX ORDER — HYDROMORPHONE HYDROCHLORIDE 1 MG/ML
0.2 INJECTION, SOLUTION INTRAMUSCULAR; INTRAVENOUS; SUBCUTANEOUS EVERY 5 MIN PRN
Status: DISCONTINUED | OUTPATIENT
Start: 2024-02-05 | End: 2024-02-05 | Stop reason: HOSPADM

## 2024-02-05 RX ORDER — LISINOPRIL 40 MG/1
40 TABLET ORAL DAILY
Status: DISCONTINUED | OUTPATIENT
Start: 2024-02-05 | End: 2024-02-05

## 2024-02-05 RX ORDER — HEPARIN SODIUM 5000 [USP'U]/ML
5000 INJECTION, SOLUTION INTRAVENOUS; SUBCUTANEOUS EVERY 12 HOURS SCHEDULED
Status: DISCONTINUED | OUTPATIENT
Start: 2024-02-05 | End: 2024-02-05

## 2024-02-05 RX ORDER — MIDAZOLAM HYDROCHLORIDE 1 MG/ML
INJECTION INTRAMUSCULAR; INTRAVENOUS AS NEEDED
Status: DISCONTINUED | OUTPATIENT
Start: 2024-02-05 | End: 2024-02-05 | Stop reason: SURG

## 2024-02-05 RX ORDER — NALOXONE HYDROCHLORIDE 0.4 MG/ML
0.08 INJECTION, SOLUTION INTRAMUSCULAR; INTRAVENOUS; SUBCUTANEOUS AS NEEDED
Status: DISCONTINUED | OUTPATIENT
Start: 2024-02-05 | End: 2024-02-05 | Stop reason: HOSPADM

## 2024-02-05 RX ORDER — PROCHLORPERAZINE EDISYLATE 5 MG/ML
5 INJECTION INTRAMUSCULAR; INTRAVENOUS EVERY 8 HOURS PRN
Status: DISCONTINUED | OUTPATIENT
Start: 2024-02-05 | End: 2024-02-05

## 2024-02-05 RX ORDER — ACETAMINOPHEN 10 MG/ML
INJECTION, SOLUTION INTRAVENOUS AS NEEDED
Status: DISCONTINUED | OUTPATIENT
Start: 2024-02-05 | End: 2024-02-05 | Stop reason: SURG

## 2024-02-05 RX ORDER — MORPHINE SULFATE 2 MG/ML
1 INJECTION, SOLUTION INTRAMUSCULAR; INTRAVENOUS EVERY 2 HOUR PRN
Status: DISCONTINUED | OUTPATIENT
Start: 2024-02-05 | End: 2024-02-05

## 2024-02-05 RX ORDER — KETOROLAC TROMETHAMINE 30 MG/ML
30 INJECTION, SOLUTION INTRAMUSCULAR; INTRAVENOUS EVERY 6 HOURS PRN
Status: DISCONTINUED | OUTPATIENT
Start: 2024-02-05 | End: 2024-02-05

## 2024-02-05 RX ORDER — ATORVASTATIN CALCIUM 20 MG/1
20 TABLET, FILM COATED ORAL DAILY
Status: DISCONTINUED | OUTPATIENT
Start: 2024-02-05 | End: 2024-02-05

## 2024-02-05 RX ORDER — ACETAMINOPHEN 500 MG
500 TABLET ORAL EVERY 4 HOURS PRN
Status: DISCONTINUED | OUTPATIENT
Start: 2024-02-05 | End: 2024-02-05

## 2024-02-05 RX ORDER — KETOROLAC TROMETHAMINE 30 MG/ML
INJECTION, SOLUTION INTRAMUSCULAR; INTRAVENOUS AS NEEDED
Status: DISCONTINUED | OUTPATIENT
Start: 2024-02-05 | End: 2024-02-05 | Stop reason: SURG

## 2024-02-05 RX ORDER — ROCURONIUM BROMIDE 10 MG/ML
INJECTION, SOLUTION INTRAVENOUS AS NEEDED
Status: DISCONTINUED | OUTPATIENT
Start: 2024-02-05 | End: 2024-02-05 | Stop reason: SURG

## 2024-02-05 RX ORDER — ONDANSETRON 2 MG/ML
INJECTION INTRAMUSCULAR; INTRAVENOUS
Status: COMPLETED
Start: 2024-02-05 | End: 2024-02-05

## 2024-02-05 RX ORDER — ONDANSETRON 2 MG/ML
4 INJECTION INTRAMUSCULAR; INTRAVENOUS EVERY 6 HOURS PRN
Status: DISCONTINUED | OUTPATIENT
Start: 2024-02-05 | End: 2024-02-05

## 2024-02-05 RX ORDER — LIDOCAINE HYDROCHLORIDE 10 MG/ML
INJECTION, SOLUTION EPIDURAL; INFILTRATION; INTRACAUDAL; PERINEURAL AS NEEDED
Status: DISCONTINUED | OUTPATIENT
Start: 2024-02-05 | End: 2024-02-05 | Stop reason: SURG

## 2024-02-05 RX ORDER — LABETALOL HYDROCHLORIDE 5 MG/ML
INJECTION, SOLUTION INTRAVENOUS AS NEEDED
Status: DISCONTINUED | OUTPATIENT
Start: 2024-02-05 | End: 2024-02-05 | Stop reason: SURG

## 2024-02-05 RX ORDER — MIDAZOLAM HYDROCHLORIDE 1 MG/ML
1 INJECTION INTRAMUSCULAR; INTRAVENOUS EVERY 5 MIN PRN
Status: DISCONTINUED | OUTPATIENT
Start: 2024-02-05 | End: 2024-02-05 | Stop reason: HOSPADM

## 2024-02-05 RX ORDER — SENNOSIDES 8.6 MG
17.2 TABLET ORAL NIGHTLY PRN
Status: DISCONTINUED | OUTPATIENT
Start: 2024-02-05 | End: 2024-02-05

## 2024-02-05 RX ORDER — POLYETHYLENE GLYCOL 3350 17 G/17G
17 POWDER, FOR SOLUTION ORAL DAILY PRN
Status: DISCONTINUED | OUTPATIENT
Start: 2024-02-05 | End: 2024-02-05

## 2024-02-05 RX ORDER — DIPHENHYDRAMINE HYDROCHLORIDE 50 MG/ML
12.5 INJECTION INTRAMUSCULAR; INTRAVENOUS AS NEEDED
Status: DISCONTINUED | OUTPATIENT
Start: 2024-02-05 | End: 2024-02-05 | Stop reason: HOSPADM

## 2024-02-05 RX ORDER — SODIUM CHLORIDE, SODIUM LACTATE, POTASSIUM CHLORIDE, CALCIUM CHLORIDE 600; 310; 30; 20 MG/100ML; MG/100ML; MG/100ML; MG/100ML
INJECTION, SOLUTION INTRAVENOUS CONTINUOUS PRN
Status: DISCONTINUED | OUTPATIENT
Start: 2024-02-05 | End: 2024-02-05 | Stop reason: SURG

## 2024-02-05 RX ORDER — BUPIVACAINE HYDROCHLORIDE AND EPINEPHRINE 5; 5 MG/ML; UG/ML
INJECTION, SOLUTION EPIDURAL; INTRACAUDAL; PERINEURAL AS NEEDED
Status: DISCONTINUED | OUTPATIENT
Start: 2024-02-05 | End: 2024-02-05 | Stop reason: HOSPADM

## 2024-02-05 RX ORDER — ACETAMINOPHEN 500 MG
500 TABLET ORAL EVERY 6 HOURS PRN
Status: DISCONTINUED | OUTPATIENT
Start: 2024-02-05 | End: 2024-02-05

## 2024-02-05 RX ORDER — MORPHINE SULFATE 4 MG/ML
4 INJECTION, SOLUTION INTRAMUSCULAR; INTRAVENOUS EVERY 2 HOUR PRN
Status: DISCONTINUED | OUTPATIENT
Start: 2024-02-05 | End: 2024-02-05

## 2024-02-05 RX ORDER — ACETAMINOPHEN 500 MG
1000 TABLET ORAL EVERY 6 HOURS PRN
Status: DISCONTINUED | OUTPATIENT
Start: 2024-02-05 | End: 2024-02-05

## 2024-02-05 RX ADMIN — KETOROLAC TROMETHAMINE 30 MG: 30 INJECTION, SOLUTION INTRAMUSCULAR; INTRAVENOUS at 12:58:00

## 2024-02-05 RX ADMIN — SODIUM CHLORIDE, SODIUM LACTATE, POTASSIUM CHLORIDE, CALCIUM CHLORIDE: 600; 310; 30; 20 INJECTION, SOLUTION INTRAVENOUS at 12:16:00

## 2024-02-05 RX ADMIN — LIDOCAINE HYDROCHLORIDE 75 MG: 10 INJECTION, SOLUTION EPIDURAL; INFILTRATION; INTRACAUDAL; PERINEURAL at 12:17:00

## 2024-02-05 RX ADMIN — ACETAMINOPHEN 1000 MG: 10 INJECTION, SOLUTION INTRAVENOUS at 12:25:00

## 2024-02-05 RX ADMIN — ROCURONIUM BROMIDE 50 MG: 10 INJECTION, SOLUTION INTRAVENOUS at 12:19:00

## 2024-02-05 RX ADMIN — LABETALOL HYDROCHLORIDE 5 MG: 5 INJECTION, SOLUTION INTRAVENOUS at 13:03:00

## 2024-02-05 RX ADMIN — ONDANSETRON 4 MG: 2 INJECTION INTRAMUSCULAR; INTRAVENOUS at 12:42:00

## 2024-02-05 RX ADMIN — DEXAMETHASONE SODIUM PHOSPHATE 4 MG: 4 MG/ML VIAL (ML) INJECTION at 12:42:00

## 2024-02-05 RX ADMIN — LIDOCAINE HYDROCHLORIDE ANHYDROUS AND DEXTROSE MONOHYDRATE 2 MG/KG/HR: .8; 5 INJECTION, SOLUTION INTRAVENOUS at 12:26:00

## 2024-02-05 RX ADMIN — MIDAZOLAM HYDROCHLORIDE 2 MG: 1 INJECTION INTRAMUSCULAR; INTRAVENOUS at 12:17:00

## 2024-02-05 NOTE — ANESTHESIA PREPROCEDURE EVALUATION
PRE-OP EVALUATION    Patient Name: Sharon Quiñones    Admit Diagnosis: Calculus of gallbladder without cholecystitis without obstruction [K80.20]    Pre-op Diagnosis: Cholecystitis [K81.9]    LAPAROSCOPIC CHOLECYSTECTOMY    Anesthesia Procedure: LAPAROSCOPIC CHOLECYSTECTOMY    Surgeon(s) and Role:     * Rudi Kaplan MD - Primary    Pre-op vitals reviewed.  Temp: 98.8 °F (37.1 °C)  Pulse: 66  Resp: 14  BP: 154/90  SpO2: 99 %  Body mass index is 23.03 kg/m².    Current medications reviewed.  Hospital Medications:   sodium chloride 0.9% infusion   Intravenous Continuous    [MAR Hold] acetaminophen (Tylenol Extra Strength) tab 500 mg  500 mg Oral Q4H PRN    [MAR Hold] morphINE PF 2 MG/ML injection 1 mg  1 mg Intravenous Q2H PRN    Or    [MAR Hold] morphINE PF 2 MG/ML injection 2 mg  2 mg Intravenous Q2H PRN    Or    [MAR Hold] morphINE PF 4 MG/ML injection 4 mg  4 mg Intravenous Q2H PRN    [MAR Hold] ondansetron (Zofran) 4 MG/2ML injection 4 mg  4 mg Intravenous Q6H PRN    [MAR Hold] prochlorperazine (Compazine) 10 MG/2ML injection 5 mg  5 mg Intravenous Q8H PRN    [MAR Hold] polyethylene glycol (PEG 3350) (Miralax) 17 g oral packet 17 g  17 g Oral Daily PRN    [MAR Hold] sennosides (Senokot) tab 17.2 mg  17.2 mg Oral Nightly PRN    [MAR Hold] bisacodyl (Dulcolax) 10 MG rectal suppository 10 mg  10 mg Rectal Daily PRN    [MAR Hold] atorvastatin (Lipitor) tab 20 mg  20 mg Oral Daily    [MAR Hold] lisinopril (Prinivil; Zestril) tab 40 mg  40 mg Oral Daily    [MAR Hold] pantoprazole (Protonix) DR tab 20 mg  20 mg Oral QAM AC    [MAR Hold] acetaminophen (Tylenol Extra Strength) tab 500 mg  500 mg Oral Q6H PRN    Or    [MAR Hold] acetaminophen (Tylenol Extra Strength) tab 1,000 mg  1,000 mg Oral Q6H PRN    [COMPLETED] sodium chloride 0.9 % IV bolus 500 mL  500 mL Intravenous Once    Followed by    sodium chloride 0.9% infusion   Intravenous Continuous       Outpatient Medications:     Medications Prior to Admission    Medication Sig Dispense Refill Last Dose    PANTOPRAZOLE 20 MG Oral Tab EC TAKE 1 TABLET (20 MG TOTAL) BY MOUTH DAILY BEFORE A MEAL 90 tablet 2 2/5/2024    ATORVASTATIN 20 MG Oral Tab TAKE 1 TABLET BY MOUTH EVERY DAY 90 tablet 0 2/5/2024    FOSINOPRIL SODIUM 40 MG Oral Tab TAKE ONE (40MG) TABLET BY MOUTH ONCE DAILY 90 tablet 1 2/5/2024       Allergies: Levofloxacin, Metronidazole, Codeine, and Tamiflu [oseltamivir]      Anesthesia Evaluation        Anesthetic Complications           GI/Hepatic/Renal      (+) GERD                           Cardiovascular  Comment: 1. Left ventricle: The cavity size was normal. Wall thickness was at the      upper limits of normal. Systolic function was normal. The estimated      ejection fraction was 60-65%. Doppler parameters are consistent with      abnormal left ventricular relaxation - grade 1 diastolic dysfunction.   2. Pulmonary arteries: Systolic pressure could not be accurately estimated.   Impressions:  This study is compared with previous dated 09/02/2021: No   significant change since prior study.         Exercise tolerance: good     MET: >4    (-) obesity  (+) hypertension     (+) CAD                                Endo/Other    Negative endo/other ROS.                              Pulmonary    Negative pulmonary ROS.                       Neuro/Psych    Negative neuro/psych ROS.                                  Past Surgical History:   Procedure Laterality Date    EGD N/A 6/16/2021    Procedure: ESOPHAGOGASTRODUODENOSCOPY w/ bxs, COLONOSCOPY,;  Surgeon: Clovis Parsons MD;  [facility]     Social History     Socioeconomic History    Marital status:     Number of children: 2   Occupational History    Occupation:    Tobacco Use    Smoking status: Never    Smokeless tobacco: Never   Vaping Use    Vaping Use: Never used   Substance and Sexual Activity    Alcohol use: Yes     Alcohol/week: 0.0 standard drinks of alcohol      Comment: occas, 2 per week, cage score 0    Drug use: No    Sexual activity: Yes     Partners: Male   Other Topics Concern    Caffeine Concern Yes    Occupational Exposure No    Hobby Hazards No    Sleep Concern Yes    Stress Concern Yes    Weight Concern No    Special Diet Yes    Back Care No    Exercise No    Seat Belt Yes     History   Drug Use No     Available pre-op labs reviewed.  Lab Results   Component Value Date    WBC 6.3 02/05/2024    RBC 4.46 02/05/2024    HGB 13.8 02/05/2024    HCT 41.8 02/05/2024    MCV 93.7 02/05/2024    MCH 30.9 02/05/2024    MCHC 33.0 02/05/2024    RDW 12.7 02/05/2024    .0 02/05/2024     Lab Results   Component Value Date     02/05/2024    K 4.4 02/05/2024     (H) 02/05/2024    CO2 29.0 02/05/2024    BUN 8 (L) 02/05/2024    CREATSERUM 0.55 02/05/2024     (H) 02/05/2024    CA 8.7 02/05/2024            Airway      Mallampati: II  Mouth opening: 3 FB  TM distance: < 4 cm   Cardiovascular    Cardiovascular exam normal.         Dental    Dentition appears grossly intact         Pulmonary    Pulmonary exam normal.                 Other findings              ASA: 2   Plan: general  NPO status verified and   Patient has taken beta blockers in last 24 hours.      Comment: I discussed the rare but serious complications of anesthesia, including but not limited to cardiovascular complications, allergic reaction to medications, pulomary/respiratory complications, post-operative nausea, post-op pain, damage to dentition, aspiration, or sore throat. The patient expressed understanding of plan and agreement.   Plan for opioid avoidance discussed. Patient would like us to not administer opioid pain medications, except in case of severe pain level.  Plan/risks discussed with: patient                Present on Admission:  **None**

## 2024-02-05 NOTE — DISCHARGE INSTRUCTIONS
Home Care Instructions  CHOLECYSTECTOMY ([place])  Dr DAMARIS Kaplan.    MEDICATIONS   You should anticipate moderate to severe pain for the first few days.   Your surgeon has prescribed for you a pain medication., usually Norco. Take the pain  medication as prescribed. You may use ibuprofen( Motrin ) 600 mg 3 times a day with the Norco or by itself if needed.   If you experience severe nausea or vomiting stop the pain medication and call our office.    DIET   Your diet should be as you tolerate. Eat light foods the first 24 hours.   Avoid high fat, greasy foods, milk products and cheese for the few days and slowly reintroduced these foods into your diet.   Do not drink alcohol while taking the pain medication    ACTIVITY   You should not drive for the first 3 to 5 days or if you are still requiring prescription pain medication.   No lifting greater than 10 to 15 pounds for 3 weeks   Avoid strenuous activity such as running and physical workouts for 3 weeks. Normal daily activities are fine, such as climbing stairs   You may shower after 24 hours.    You may return to work as instructed by your surgeon.    CARE OF SURGICAL SITE   Your incisions have glue on the surface. This will gradually come off over time.   Pain, colorful bruising and slight swelling at the incision sites is usually normal   If you experience fever, chills, inability to urinate, nausea with vomiting, severe diarrhea  or severe, cramping abdominal pain please contact your surgeon    APPOINTMENT   Call the office to make an appointment to see yoursurgeon in one week.   Should you develop any problems prior to your scheduled appointment, do not hesitate  to call the office.  [facility]  [phone]

## 2024-02-05 NOTE — CONSULTS
[facility]  Report of Consultation    Sharon Quiñones Patient Status:  Inpatient    1965 MRN [id number]   Location [facility] 3NW-A Attending Tao Jimenez MD   Hosp Day # 0 PCP Bobby Winston MD     2024    Reason for Consultation:    Surgical Consultation     CC:   Chief Complaint   Patient presents with    Chest Pain Angina        History of Present Illness:    Sharon Quiñones is a a(n) 58 year old female. Patient reports feeling a burning in her upper abdomen last week describes as burning in her mid upper abdomen, radiating to her shoulder. She was evaluated at the [facility] where a ct scan was obtained revealing a 4mm gallstone in neck of gb. She was dc home and was evaluated by her GI physician the next day and her PPI was increased to daily.   Patient reports noticing after eating pizza/wine for dinner an increase in her pain,  morning rodgers breakfast sandwich her pain progressively worsened in intensity and length of time. Describes mid abdominal pain though slightly to the left. Some belching, no emesis.   She was evaluated in the ER where an US as obtained revealing cholelithiasis, borderline gb wall thickening. No pericholecystic fluid present   She reports feeling this pain for at least one month though didn't realize it was her gb, she has h/o scoliosis and does have trigger point injections secondary to her muscular pain.  She denies any previous abdominal surgery  Denies any use of anticoagulation at home      Past Medical History:    Past Medical History:   Diagnosis Date    Diverticulitis     GERD (gastroesophageal reflux disease)     Hyperlipidemia     Scoliosis     Unspecified essential hypertension        Past Surgical History:    Past Surgical History:   Procedure Laterality Date    EGD N/A 2021    Procedure: ESOPHAGOGASTRODUODENOSCOPY w/ bxs, COLONOSCOPY,;  Surgeon: Clovis Parsons MD;  Location: [facility]       Family History:    Family History    Problem Relation Age of Onset    Hypertension Father     Diabetes Father     Cancer Father         prostate    Other (Other) Father     Hypertension Mother     Heart Disorder Mother         CAD - stent    Psychiatric Mother     Obesity Mother     Other (Other) Mother     Psychiatric Sister         anxiety/depression    Heart Disorder Maternal Uncle         CABG    Other (Other) Sister         gall bladder    Heart Disorder Maternal Uncle         pacemaker       Social History:     reports that she has never smoked. She has never used smokeless tobacco. She reports current alcohol use. She reports that she does not use drugs.    Allergies:    Allergies   Allergen Reactions    Levofloxacin OTHER (SEE COMMENTS)     Anaphylaxis      Metronidazole OTHER (SEE COMMENTS)     Hard Time Swallowing Saliva, no Anaphylaxis     Codeine DIZZINESS    Tamiflu [Oseltamivir] OTHER (SEE COMMENTS)     INSOMNIA        Current Medications:      Current Facility-Administered Medications:     sodium chloride 0.9% infusion, , Intravenous, Continuous    acetaminophen (Tylenol Extra Strength) tab 500 mg, 500 mg, Oral, Q4H PRN    morphINE PF 2 MG/ML injection 1 mg, 1 mg, Intravenous, Q2H PRN **OR** morphINE PF 2 MG/ML injection 2 mg, 2 mg, Intravenous, Q2H PRN **OR** morphINE PF 4 MG/ML injection 4 mg, 4 mg, Intravenous, Q2H PRN    ondansetron (Zofran) 4 MG/2ML injection 4 mg, 4 mg, Intravenous, Q6H PRN    prochlorperazine (Compazine) 10 MG/2ML injection 5 mg, 5 mg, Intravenous, Q8H PRN    polyethylene glycol (PEG 3350) (Miralax) 17 g oral packet 17 g, 17 g, Oral, Daily PRN    sennosides (Senokot) tab 17.2 mg, 17.2 mg, Oral, Nightly PRN    bisacodyl (Dulcolax) 10 MG rectal suppository 10 mg, 10 mg, Rectal, Daily PRN    atorvastatin (Lipitor) tab 20 mg, 20 mg, Oral, Daily    lisinopril (Prinivil; Zestril) tab 40 mg, 40 mg, Oral, Daily    pantoprazole (Protonix) DR tab 20 mg, 20 mg, Oral, QAM AC    acetaminophen (Tylenol Extra Strength) tab 500  mg, 500 mg, Oral, Q6H PRN **OR** acetaminophen (Tylenol Extra Strength) tab 1,000 mg, 1,000 mg, Oral, Q6H PRN    [COMPLETED] sodium chloride 0.9 % IV bolus 500 mL, 500 mL, Intravenous, Once **FOLLOWED BY** sodium chloride 0.9% infusion, , Intravenous, Continuous    Home Medications:    Current Facility-Administered Medications on File Prior to Encounter   Medication Dose Route Frequency Provider Last Rate Last Admin    [COMPLETED] iopamidol 76% (ISOVUE-370) injection for power injector  80 mL Intravenous ONCE PRN Janay Vega APRN   80 mL at 01/30/24 1005     Current Outpatient Medications on File Prior to Encounter   Medication Sig Dispense Refill    PANTOPRAZOLE 20 MG Oral Tab EC TAKE 1 TABLET (20 MG TOTAL) BY MOUTH DAILY BEFORE A MEAL 90 tablet 2    ATORVASTATIN 20 MG Oral Tab TAKE 1 TABLET BY MOUTH EVERY DAY 90 tablet 0    FOSINOPRIL SODIUM 40 MG Oral Tab TAKE ONE (40MG) TABLET BY MOUTH ONCE DAILY 90 tablet 1       Review of Systems:    10 point review performed pertinent positives and negatives per history of present illness.    Physical Exam:    Blood pressure 158/87, pulse 64, temperature 98.8 °F (37.1 °C), temperature source Oral, resp. rate 16, height 5' 3\" (1.6 m), weight 130 lb (59 kg), last menstrual period 08/20/2015, SpO2 98%.    GENERAL: Alert and oriented x 3, well developed, well nourished female, in no apparent distress    SKIN: anicteric    RESPIRATORY: non labored breathing    CARDIOVASCULAR: RRR    ABDOMEN: soft, non distended, mild tenderness in epigastrium extending just left of midline     LYMPHATIC: no lymphadenopathy    EXTREMITIES: no cyanosis, clubbing or edema    .    Laboratory Data:  Recent Labs   Lab 01/30/24  0939 02/04/24 1943   WBC  --  7.5   HGB  --  14.4   MCV 92.6 93.7   PLT  --  247.0       Recent Labs   Lab 02/04/24 1943      K 3.9      CO2 28.0   BUN 14   CREATSERUM 0.66   *   CA 9.2       Recent Labs   Lab 02/04/24 1943   ALT 35   AST 21   ALB 3.8        No results for input(s): \"TROP\" in the last 168 hours.          Radiology:    US GALLBLADDER (CPT=76705)    Result Date: 2/4/2024  PROCEDURE:  US GALLBLADDER (CPT=76705)  COMPARISON:  [facility], CT, CT ABDOMEN+PELVIS(CONTRAST ONLY)(CPT=74177), 1/30/2024, 10:02 AM.  INDICATIONS:  Abdominal pain.  pain in right side of chest.  TECHNIQUE:  Real time gray-scale ultrasound was used to evaluate the gallbladder.   PATIENT STATED HISTORY: (As transcribed by Technologist)               CONCLUSION:    Partially contracted gallbladder.  Small gallstones present.  1 stone appears to be in the gallbladder neck region, 4 mm size.  Borderline gallbladder wall thickening, 3 mm wall thickness, may be secondary to the nondistended state of the gallbladder.  However there is no Chew sign, or gallbladder dilation, and no surrounding fluid.  Common bile duct normal caliber, 4 mm.  LOCATION:  [facility]    Dictated by (CST): Martin Mcdermott MD on 2/04/2024 at 9:46 PM     Finalized by (CST): Martin Mcdermott MD on 2/04/2024 at 9:48 PM       CT ABDOMEN+PELVIS(CONTRAST ONLY)(CPT=74177)    Result Date: 1/30/2024  PROCEDURE:  CT ABDOMEN+PELVIS (CONTRAST ONLY) (CPT=74177)  COMPARISON:  [facility], CT, CT ABDOMEN+PELVIS(CONTRAST ONLY)(CPT=74177), 8/13/2021, 0:45 AM.  INDICATIONS:  Abdominal Pain  TECHNIQUE:  CT scanning was performed from the dome of the diaphragm to the pubic symphysis with non-ionic intravenous contrast material. Post contrast coronal MPR imaging was performed.  Dose reduction techniques were used. Dose information is transmitted to the ACR (American College of Radiology) NRDR (National Radiology Data Registry) which includes the Dose Index Registry.  PATIENT STATED HISTORY:(As transcribed by Technologist)  Patient presents with left upper quadrant pain/left mid pain since sunday. Hx diverticulitis   CONTRAST USED:  65cc of Isovue 370  FINDINGS:  LIVER:  No enlargement, atrophy, abnormal density, or significant focal  lesion.  BILIARY:  There is a gallstone measuring 4 mm within the neck of the gallbladder. PANCREAS:  No lesion, fluid collection, ductal dilatation, or atrophy.  SPLEEN:  No enlargement or focal lesion.  KIDNEYS:  No mass, obstruction, or calcification.  ADRENALS:  No mass or enlargement.  AORTA/VASCULAR:  No aneurysm or dissection.  RETROPERITONEUM:  No mass or adenopathy.  BOWEL/MESENTERY:  No visible mass, obstruction, or bowel wall thickening.  Diverticulosis of the left colon without diverticulitis.  The appendix is normal. ABDOMINAL WALL:  No mass or hernia.  URINARY BLADDER:  No visible focal wall thickening, lesion, or calculus.  PELVIC NODES:  No adenopathy.  PELVIC ORGANS:  No visible mass.  Pelvic organs appropriate for patient age.  BONES:  S-shaped scoliosis of the thoracolumbar spine.  No bony lesion or fracture.  LUNG BASES:  No visible pulmonary or pleural disease.  OTHER:  Negative.             CONCLUSION:   1.  No acute abdominal pelvic process.  2. Diverticulosis of the left colon without diverticulitis.  3. Cholelithiasis without ductal dilatation.   LOCATION:  [facility]   Dictated by (CST): Kumar Trammell MD on 1/30/2024 at 10:51 AM     Finalized by (CST): Kumar Trammell MD on 1/30/2024 at 10:54 AM          Problem List:    Patient Active Problem List   Diagnosis    Depressive disorder, not elsewhere classified    Anxiety    Tendonitis of wrist, left    Ganglion cyst    Hypercholesterolemia    Scoliosis    Closed fracture of cuboid bone of right foot, initial encounter    Essential hypertension    Coronary artery disease involving native coronary artery of native heart without angina pectoris    Seasonal allergic rhinitis, unspecified allergic rhinitis trigger    Abnormal Heart Score CT    Gastroesophageal reflux disease, esophagitis presence not specified    Sinus congestion    Hyponatremia    Thrombocytopenia (HCC)    Hyperglycemia    Pneumonia due to COVID-19 virus    Diverticulitis    PTSD  (post-traumatic stress disorder)    Calculus of gallbladder without cholecystitis without obstruction       Impression:    57 y/o with biliary colic  Pancreatitis   US as noted above  No leukocytosis  Lipase with mild elevation of 108 today  Lfts without elevation  Soft, non distended, mild epigastric tenderness extending to LUQ    Plan:    Will repeat labs this morning including lipase  NPO  IV fluids  IV antibiotics  Encourage ambulation/IS  Reviewed surgical management with patient including risks of surgery  Would proceed surgically though would like to see the trend of lipase  All questions answered  Will discuss with RUBIO Cuenca  [facility]  General Surgery  2/5/2024

## 2024-02-05 NOTE — ED INITIAL ASSESSMENT (HPI)
Pt to ed with complaints of right sided chest pain since Wednesday, getting progressively worse per pt. Pt was seen at immediate care on Wednesday for right sided abdominal pain and told she had a gallstone

## 2024-02-05 NOTE — PLAN OF CARE
Patient admitted via wheelchair from ER.   Oriented to room.   Safety precautions initiated.   Bed in low position.  Call light in reach.

## 2024-02-05 NOTE — ANESTHESIA PROCEDURE NOTES
Airway  Date/Time: 2/5/2024 12:21 PM  Urgency: elective      General Information and Staff    Patient location during procedure: OR  Anesthesiologist: Mindy Pichardo MD  Performed: anesthesiologist   Performed by: Mindy Pichardo MD  Authorized by: Mindy Pichardo MD      Indications and Patient Condition  Indications for airway management: anesthesia  Sedation level: deep  Preoxygenated: yes  Patient position: sniffing  Mask difficulty assessment: 1 - vent by mask    Final Airway Details  Final airway type: endotracheal airway      Successful airway: ETT  Cuffed: yes   Successful intubation technique: direct laryngoscopy  Endotracheal tube insertion site: oral  Blade: Cait  Blade size: #3  ETT size (mm): 7.0    Cormack-Lehane Classification: grade I - full view of glottis  Placement verified by: capnometry   Cuff volume (mL): 10  Measured from: lips  ETT to lips (cm): 22  Number of attempts at approach: 1  Number of other approaches attempted: 0    Additional Comments  Dentition unchanged from pre op exam

## 2024-02-05 NOTE — PROGRESS NOTES
NURSING DISCHARGE NOTE    Discharged Home via Wheelchair.  Accompanied by Family member  Belongings Taken by patient/family.    VSS, tolerating diet, voiding adequately, pain controlled, tolerating ambulating. Writing RN spoke w/ hospitalist and Sx, pt ok to dc. Discharge education provided. Reviewed medications and follow up appts. All questions answered and concerns addressed, pt verbalized understanding. Pt dc in stable condition.    1445 - Pt back from OR. VSS. Pain controlled. Voided. Laps x4 w/ skin glue, C/D/I. POC discussed w/ pt and spouse, all questions answered and concerns addressed. Safety precautions in place. Frequent rounds performed.     1155 - Pt off unit to OR.     Upon assessment, A&Ox4. RA. SCDs off, educated pt to perform ankle pumps. NPO. Abdomen soft and tender. Active bowel sounds. Voids. Up ad srini. IVF infusing. Deneis SOB, CP, lightheadedness, and N/V. C/o mild pain, declines pharmacological interventions at this time. POC discussed, all questions answered and concerns addressed. Safety precautions in place. Frequent rounds performed.

## 2024-02-05 NOTE — ED QUICK NOTES
Orders for admission, patient is aware of plan and ready to go upstairs. Any questions, please call ED Srinivasan at extension [phone].     Patient Covid vaccination status: Unvaccinated     COVID Test Ordered in ED: None    COVID Suspicion at Admission: N/A    Running Infusions:    sodium chloride          Mental Status/LOC at time of transport: alert and orient    Other pertinent information:   CIWA score: N/A   NIH score:  N/A

## 2024-02-05 NOTE — OPERATIVE REPORT
Report of Operation    Sharon JOSE CARLOS Quiñones Patient Status:  Inpatient    1965 MRN [id number]   [facility] SURGERY Attending Tao Jimenez MD   Hosp Day # 0 PCP Bobby Winston MD         2024    Sharon Quiñones    PRE-OPERATIVE DIAGNOSIS:                      Cholecystitis with cholelithiasis    POST-OPERATIVE DIAGNOSIS:                    Cholecystitis with cholelithiasis    PROCEDURE:                                                Laparoscopic cholecystectomy    SURGEON:                                                    Rudi Kaplan MD    ASSISTANT:                                                  Lillian Macario sa    A surgical assistant was medically necessary and needed for the entire procedure to help with positioning, prepping, instrument passing and holding, opening, closing, and suturing.      ANESTHESIA:                                                General    EBL:                                                               5cc          PROCEDURE:                 Patient was taken to the operating room after informed consent and proper identification. Placed on the operating room table and administered a general anesthetic. Patient's abdomen was prepped and draped in usual sterile fashion. A Infraumbilical incision was performed with a #11 scalpel. Veress needle was introduced into the peritoneal cavity and a pneumoperitoneum was created. 11mm millimeter trocar was introduced into the umbilical incision and a laparoscope was introduced. A 5 mm port was placed in the epigastric region and 2 other 5 mm trochars were placed in the right upper abdomen. The gallbladder was grasped and retracted over the liver edge. The cystic duct was identified clipped and divided. The cystic artery and its branches were identified clipped and divided. The gallbladder was removed from the liver edge by taking down its peritoneal  attachments with hook electrocautery. Gallbladder was then delivered through the umbilical incision. All ports were re-introduced into the abdominal cavity. Gallbladder fossa and right gutter was irrigated until clear and hemostasis was achieved. The pneumoperitoneum was decompressed with suction and all ports removed. The umbilical fascia was closed with 0 Vicryl. All skin incisions were closed with 4-0 Vicryl in a subcuticular fashion.  Dermabond was then applied to the surface of the incision.  Patient tolerated the procedure well. The instrument and sponge count was correct after surgery.    Rudi Kaplan MD  2/5/2024

## 2024-02-05 NOTE — ANESTHESIA POSTPROCEDURE EVALUATION
[facility]    Sharon Quiñones Patient Status:  Inpatient   Age/Gender 58 year old female MRN [id number]   Location [facility] POST ANESTHESIA CARE UNIT Attending Tao Jimenez MD   Hosp Day # 0 PCP Bobby Winston MD       Anesthesia Post-op Note    LAPAROSCOPIC CHOLECYSTECTOMY    Procedure Summary       Date: 02/05/24 Room / Location:  MAIN OR 03 / [facility] MAIN OR    Anesthesia Start: 1214 Anesthesia Stop: 1312    Procedure: LAPAROSCOPIC CHOLECYSTECTOMY Diagnosis:       Cholecystitis      (Cholecystitis [K81.9])    Surgeons: Rudi Kaplan MD Anesthesiologist: Mindy Pichardo MD    Anesthesia Type: general ASA Status: 2            Anesthesia Type: general    Vitals Value Taken Time   /89 02/05/24 1327   Temp 98 02/05/24 1327   Pulse 56 02/05/24 1327   Resp 14 02/05/24 1327   SpO2 94 02/05/24 1327       Patient Location: PACU    Anesthesia Type: general    Airway Patency: patent    Postop Pain Control: adequate    Mental Status: mildly sedated but able to meaningfully participate in the post-anesthesia evaluation    Nausea/Vomiting: none    Cardiopulmonary/Hydration status: stable euvolemic    Complications: no apparent anesthesia related complications    Postop vital signs: stable    Dental Exam: Unchanged from Preop    Patient to be discharged from PACU when criteria met.

## 2024-02-05 NOTE — H&P
[facility]    History and Physical     Sharon Quiñones Patient Status:  Inpatient    1965 MRN [id number]   Location [facility] SURGERY Attending Tao Jimenez MD   Hosp Day # 0 PCP Bobby Winston MD     Chief Complaint:  Abdominal Pain     History of Present Illness: Sharon Quiñones is a 58 year old female with history of gerd, diverticulitis, hld, htn, prediabetes presenting with abdominal pain. Patient says that the pain is in the upper abdomen and has radiated to her shoulders. She has been seen in the [facility] where she had a CT that showed a gallstone in the neck of the gallbladder.  She was to be seen by gastroenterology as an outpatient who started her on PPI as her symptoms seem to get worse after eating food.  She is also had symptoms of belching but no vomiting her pain has been on the right side but has at times been on the left side as well.  Ultimately since symptoms were progressively worse without any improvement she came to emergency room for further evaluation and treatment.  Patient denies any other positive review of systems.    Past Medical History:  Past Medical History:   Diagnosis Date    Diverticulitis     GERD (gastroesophageal reflux disease)     Hyperlipidemia     Scoliosis     Unspecified essential hypertension         Past Surgical History:   Past Surgical History:   Procedure Laterality Date    EGD N/A 2021    Procedure: ESOPHAGOGASTRODUODENOSCOPY w/ bxs, COLONOSCOPY,;  Surgeon: Clovis Parsons MD;  Location: [facility] SURGICAL [facility]       Social History:  reports that she has never smoked. She has never used smokeless tobacco. She reports current alcohol use. She reports that she does not use drugs.    Family History:   Family History   Problem Relation Age of Onset    Hypertension Father     Diabetes Father     Cancer Father         prostate    Other (Other) Father     Hypertension Mother     Heart Disorder Mother         CAD - stent    Psychiatric Mother      Obesity Mother     Other (Other) Mother     Psychiatric Sister         anxiety/depression    Heart Disorder Maternal Uncle         CABG    Other (Other) Sister         gall bladder    Heart Disorder Maternal Uncle         pacemaker       Allergies:   Allergies   Allergen Reactions    Levofloxacin OTHER (SEE COMMENTS)     Anaphylaxis      Metronidazole OTHER (SEE COMMENTS)     Hard Time Swallowing Saliva, no Anaphylaxis     Codeine DIZZINESS    Tamiflu [Oseltamivir] OTHER (SEE COMMENTS)     INSOMNIA        Medications:    Current Facility-Administered Medications on File Prior to Encounter   Medication Dose Route Frequency Provider Last Rate Last Admin    [COMPLETED] iopamidol 76% (ISOVUE-370) injection for power injector  80 mL Intravenous ONCE PRN Janay Vega APRN   80 mL at 01/30/24 1005     Current Outpatient Medications on File Prior to Encounter   Medication Sig Dispense Refill    PANTOPRAZOLE 20 MG Oral Tab EC TAKE 1 TABLET (20 MG TOTAL) BY MOUTH DAILY BEFORE A MEAL 90 tablet 2    ATORVASTATIN 20 MG Oral Tab TAKE 1 TABLET BY MOUTH EVERY DAY 90 tablet 0    FOSINOPRIL SODIUM 40 MG Oral Tab TAKE ONE (40MG) TABLET BY MOUTH ONCE DAILY 90 tablet 1       Review of Systems:   A comprehensive 14 point review of systems was completed.    Pertinent positives and negatives noted in the HPI.    Physical Exam:    /90 (BP Location: Left arm)   Pulse 66   Temp 98.8 °F (37.1 °C) (Oral)   Resp 14   Ht 5' 3\" (1.6 m)   Wt 130 lb (59 kg)   LMP 08/20/2015 (Approximate)   SpO2 99%   BMI 23.03 kg/m²   General: No acute distress. Alert and oriented x 3.  HEENT: Normocephalic atraumatic. Moist mucous membranes. EOM-I  Neck: No JVD. No carotid bruits.  Respiratory: Clear to auscultation bilaterally. No wheezes. No crackles  Cardiovascular: S1, S2. Regular rate and rhythm. No murmurs  Chest and Back: No tenderness or deformity.  Abdomen: Soft, nontender, nondistended.  Positive bowel sounds. No rebound,  guarding  Neurologic: No focal neurological deficits. CNII-XII grossly intact. Sensation and strength intact  Musculoskeletal: Moves all extremities.  Extremities: No edema or tenderness of the LE  Integument: No new rashes or lesions.   Psychiatric: Appropriate mood and affect.      Diagnostic Data:      Labs:  Recent Labs   Lab 01/30/24  0939 02/04/24 1943 02/05/24  0846   WBC  --  7.5 6.3   HGB  --  14.4 13.8   MCV 92.6 93.7 93.7   PLT  --  247.0 224.0       Recent Labs   Lab 02/04/24 1943 02/05/24  0846   * 107*   BUN 14 8*   CREATSERUM 0.66 0.55   CA 9.2 8.7   ALB 3.8 3.4    143   K 3.9 4.4    114*   CO2 28.0 29.0   ALKPHO 60 55   AST 21 21   ALT 35 30   BILT 0.5 0.9   TP 6.8 6.3*       Estimated Creatinine Clearance: 93.4 mL/min (based on SCr of 0.55 mg/dL).    No results for input(s): \"PTP\", \"INR\" in the last 168 hours.    No results for input(s): \"TROP\", \"CK\" in the last 168 hours.    Imaging: Imaging data reviewed in Epic.      ASSESSMENT / PLAN:   58 year old female with history of gerd, diverticulitis, hld, htn, prediabetes presenting with abdominal pain.    Abdominal Pain  -etiology uncertain  -pt with ct showing gallstones with one in the neck  -no obvious perichole fluid  -surgery consulted--> OR today  -npo  -IV pain mew    HTN  -sbp stable  -lisinopril    HLD  -atorvastatin     GERD  -pantoprazole    Quality:  DVT Prophylaxis: scd  CODE status:   Hickman: none    Plan of care discussed with patient and staff    Dispo: no discharge    MD Wili Hancock Hospitalist  [phone]      Addendum: pt had a successful Lap jluis. Surgery ok with dc. Ambulating well. Pain tolerating. Tolerating oral intake. Pt discharged home

## 2024-02-05 NOTE — PLAN OF CARE
A&Ox4. VSS. RA. Denies chest pain and SOB.   GI: Abdomen soft, nondistended. Passing gas.   Denies nausea.   : Voids in bathroom.   Pain controlled with PRN pain medications. Pt will only take Tylenol.   Up ad srini in room.  Diet: NPO, sips with meds.   IVF running per order.   All appropriate safety measures in place. All questions and concerns addressed.

## 2024-02-06 NOTE — ED PROVIDER NOTES
Patient Seen in: [facility] 3nw-a      History     Chief Complaint   Patient presents with    Chest Pain Angina     Stated Complaint: pain in right side of chest.    Subjective:   HPI    58-year-old female complaint of right-sided chest and right upper quad abdominal pain started a few days ago she went to urgent care about 3 days before coming in the ER at that time had a CT scan of the abdomen showed cholelithiasis with stone in the neck of the gallbladder she followed up with gastroenterology couple days ago and according to the patient that the GI doctor was somewhat dismissive cholelithiasis.  Patient's has continued to have some discomfort and seem to be worsening a day with nausea no vomiting moderate pain.    Objective:   Past Medical History:   Diagnosis Date    Diverticulitis     GERD (gastroesophageal reflux disease)     Hyperlipidemia     Scoliosis     Unspecified essential hypertension               Past Surgical History:   Procedure Laterality Date    EGD N/A 6/16/2021    Procedure: ESOPHAGOGASTRODUODENOSCOPY w/ bxs, COLONOSCOPY,;  Surgeon: Clovis Parsons MD;  Location: [facility], [facility]                Social History     Socioeconomic History    Marital status:     Number of children: 2   Occupational History    Occupation: city planner   Tobacco Use    Smoking status: Never    Smokeless tobacco: Never   Vaping Use    Vaping Use: Never used   Substance and Sexual Activity    Alcohol use: Yes     Alcohol/week: 0.0 standard drinks of alcohol     Comment: occas, 2 per week, cage score 0    Drug use: No    Sexual activity: Yes     Partners: Male   Other Topics Concern    Caffeine Concern Yes    Occupational Exposure No    Hobby Hazards No    Sleep Concern Yes    Stress Concern Yes    Weight Concern No    Special Diet Yes    Back Care No    Exercise No    Seat Belt Yes     Social Determinants of Health     Food Insecurity: No Food Insecurity (2/5/2024)    Food Insecurity     Food  Insecurity: Never true   Transportation Needs: No Transportation Needs (2/5/2024)    Transportation Needs     Lack of Transportation: No   Housing Stability: Low Risk  (2/5/2024)    Housing Stability     Housing Instability: No              Review of Systems    Positive for stated complaint: pain in right side of chest.  Other systems are as noted in HPI.  Constitutional and vital signs reviewed.      All other systems reviewed and negative except as noted above.    Physical Exam     ED Triage Vitals [02/04/24 1936]   BP (!) 170/98   Pulse 72   Resp 20   Temp 98.3 °F (36.8 °C)   Temp src Temporal   SpO2 98 %   O2 Device None (Room air)       Current:/81 (BP Location: Left arm)   Pulse 57   Temp 98.7 °F (37.1 °C) (Oral)   Resp 18   Ht 160 cm (5' 3\")   Wt 59 kg   LMP 08/20/2015 (Approximate)   SpO2 92%   BMI 23.03 kg/m²         Physical Exam    Patient is alert orient x 3 no acute distress HEENT exam within normal limits neck there is no lymphadenopathy or JVD lungs are clear cardiovascular exam shows regular rate and rhythm without murmurs abdomen soft there is moderate right upper quadrant epigastric tenderness no masses guarding rebound extremities normal skin is no rash neurologic Rayo is normal.    ED Course     Labs Reviewed   COMP METABOLIC PANEL (14) - Abnormal; Notable for the following components:       Result Value    Glucose 112 (*)     All other components within normal limits   LIPASE - Abnormal; Notable for the following components:    Lipase 108 (*)     All other components within normal limits   COMP METABOLIC PANEL (14) - Abnormal; Notable for the following components:    Glucose 107 (*)     Chloride 114 (*)     BUN 8 (*)     Total Protein 6.3 (*)     All other components within normal limits   TROPONIN I HIGH SENSITIVITY - Normal   LIPASE - Normal   CBC WITH DIFFERENTIAL WITH PLATELET    Narrative:     The following orders were created for panel order CBC With Differential With  Platelet.  Procedure                               Abnormality         Status                     ---------                               -----------         ------                     CBC W/ DIFFERENTIAL[[id number]]                              Final result                 Please view results for these tests on the individual orders.   CBC, PLATELET; NO DIFFERENTIAL   SURGICAL PATHOLOGY TISSUE   RAINBOW DRAW BLUE   CBC W/ DIFFERENTIAL     EKG    Rate, intervals and axes as noted on EKG Report.  Rate: 64 anterior infarct inferior infarct age undetermined this is an abnormal EKG  Rhythm: Sinus Rhythm  Reading: Abnormal EKG            Labs unremarkable     US GALLBLADDER (CPT=76705)    Result Date: 2/4/2024  CONCLUSION:    Partially contracted gallbladder.  Small gallstones present.  1 stone appears to be in the gallbladder neck region, 4 mm size.  Borderline gallbladder wall thickening, 3 mm wall thickness, may be secondary to the nondistended state of the gallbladder.  However there is no Chew sign, or gallbladder dilation, and no surrounding fluid.  Common bile duct normal caliber, 4 mm.  LOCATION:  [facility]    Dictated by ([facility]): Martin Mcdermott MD on 2/04/2024 at 9:46 PM     Finalized by (CST): Martin Mcdermott MD on 2/04/2024 at 9:48 PM       CT ABDOMEN+PELVIS(CONTRAST ONLY)(CPT=74177)    Result Date: 1/30/2024  CONCLUSION:   1.  No acute abdominal pelvic process.  2. Diverticulosis of the left colon without diverticulitis.  3. Cholelithiasis without ductal dilatation.   LOCATION:  [facility]   Dictated by ([facility]): Kumar Trammell MD on 1/30/2024 at 10:51 AM     Finalized by (CST): Kumar Trammell MD on 1/30/2024 at 10:54 AM    images independent reviewed there is cholelithiasis         MDM      Initial differential diagnosis includes gastritis cholelithiasis cholecystitis pancreatitis peptic ulcer disease patient declined any pain medication but rated about 6 out of 10 scale patient has persistent pain for several days with  cholelithiasis and stone in the neck of the gallbladder general surgery is consulted patient was admitted for possible cholecystectomy.  Admission disposition: 2/4/2024 11:16 PM                                        Medical Decision Making      Disposition and Plan     Clinical Impression:  1. Calculus of gallbladder without cholecystitis without obstruction    2. Cholecystitis         Disposition:  Admit  2/4/2024 11:16 pm    Follow-up:  Bobby Winston MD  [address]  [facility] [address]  [phone]    Follow up      Rudi Kaplan MD  [address]  [address] [address]  [phone]    Follow up in 2 week(s)            Medications Prescribed:  Discharge Medication List as of 2/5/2024  6:19 PM                            Hospital Problems       Present on Admission  Date Reviewed: 3/22/2022            ICD-10-CM Noted POA    * (Principal) Calculus of gallbladder without cholecystitis without obstruction K80.20 2/4/2024 Unknown

## 2024-02-07 NOTE — PAYOR COMM NOTE
--------------  ADMISSION REVIEW     Payor: BLUE [employer]EP PPO  Subscriber #:  [id number]  Authorization Number: [id number]    Admit date: 2/5/24  Admit time: 12:23 AM       REVIEW DOCUMENTATION:     ED Provider Notes        ED Provider Notes signed by Bronson Howard MD at 2/6/2024 12:18 PM       Author: Bronson Howard MD Service: -- Author Type: Physician    Filed: 2/6/2024 12:18 PM Date of Service: 2/6/2024 12:14 PM Status: Signed    : Bronson Howard MD (Physician)           Patient Seen in: [facility] 3w-a      History     Chief Complaint   Patient presents with    Chest Pain Angina     Stated Complaint: pain in right side of chest.    Subjective:   HPI    58-year-old female complaint of right-sided chest and right upper quad abdominal pain started a few days ago she went to urgent care about 3 days before coming in the ER at that time had a CT scan of the abdomen showed cholelithiasis with stone in the neck of the gallbladder she followed up with gastroenterology couple days ago and according to the patient that the GI doctor was somewhat dismissive cholelithiasis.  Patient's has continued to have some discomfort and seem to be worsening a day with nausea no vomiting moderate pain.    Objective:   Past Medical History:   Diagnosis Date    Diverticulitis     GERD (gastroesophageal reflux disease)     Hyperlipidemia     Scoliosis     Unspecified essential hypertension               Past Surgical History:   Procedure Laterality Date    EGD N/A 6/16/2021    Procedure: ESOPHAGOGASTRODUODENOSCOPY w/ bxs, COLONOSCOPY,;  Surgeon: Clovis Parsons MD;  Location: [facility]                Social History     Socioeconomic History    Marital status:     Number of children: 2   Occupational History    Occupation:    Tobacco Use    Smoking status: Never    Smokeless tobacco: Never   Vaping Use    Vaping Use: Never used   Substance and Sexual Activity    Alcohol use: Yes      Alcohol/week: 0.0 standard drinks of alcohol     Comment: occas, 2 per week, cage score 0    Drug use: No    Sexual activity: Yes     Partners: Male   Other Topics Concern    Caffeine Concern Yes    Occupational Exposure No    Hobby Hazards No    Sleep Concern Yes    Stress Concern Yes    Weight Concern No    Special Diet Yes    Back Care No    Exercise No    Seat Belt Yes     Social Determinants of Health     Food Insecurity: No Food Insecurity (2/5/2024)    Food Insecurity     Food Insecurity: Never true   Transportation Needs: No Transportation Needs (2/5/2024)    Transportation Needs     Lack of Transportation: No   Housing Stability: Low Risk  (2/5/2024)    Housing Stability     Housing Instability: No              Review of Systems    Positive for stated complaint: pain in right side of chest.  Other systems are as noted in HPI.  Constitutional and vital signs reviewed.      All other systems reviewed and negative except as noted above.    Physical Exam     ED Triage Vitals [02/04/24 1936]   BP (!) 170/98   Pulse 72   Resp 20   Temp 98.3 °F (36.8 °C)   Temp src Temporal   SpO2 98 %   O2 Device None (Room air)       Current:/81 (BP Location: Left arm)   Pulse 57   Temp 98.7 °F (37.1 °C) (Oral)   Resp 18   Ht 160 cm (5' 3\")   Wt 59 kg   LMP 08/20/2015 (Approximate)   SpO2 92%   BMI 23.03 kg/m²         Physical Exam    Patient is alert orient x 3 no acute distress HEENT exam within normal limits neck there is no lymphadenopathy or JVD lungs are clear cardiovascular exam shows regular rate and rhythm without murmurs abdomen soft there is moderate right upper quadrant epigastric tenderness no masses guarding rebound extremities normal skin is no rash neurologic Rayo is normal.    ED Course     Labs Reviewed   COMP METABOLIC PANEL (14) - Abnormal; Notable for the following components:       Result Value    Glucose 112 (*)     All other components within normal limits   LIPASE - Abnormal; Notable for the  following components:    Lipase 108 (*)     All other components within normal limits   COMP METABOLIC PANEL (14) - Abnormal; Notable for the following components:    Glucose 107 (*)     Chloride 114 (*)     BUN 8 (*)     Total Protein 6.3 (*)     All other components within normal limits   TROPONIN I HIGH SENSITIVITY - Normal   LIPASE - Normal   CBC WITH DIFFERENTIAL WITH PLATELET    Narrative:     The following orders were created for panel order CBC With Differential With Platelet.  Procedure                               Abnormality         Status                     ---------                               -----------         ------                     CBC W/ DIFFERENTIAL[[id number]]                              Final result                 Please view results for these tests on the individual orders.   CBC, PLATELET; NO DIFFERENTIAL   SURGICAL PATHOLOGY TISSUE   RAINBOW DRAW BLUE   CBC W/ DIFFERENTIAL     EKG    Rate, intervals and axes as noted on EKG Report.  Rate: 64 anterior infarct inferior infarct age undetermined this is an abnormal EKG  Rhythm: Sinus Rhythm  Reading: Abnormal EKG            Labs unremarkable     US GALLBLADDER (CPT=76705)    Result Date: 2/4/2024  CONCLUSION:    Partially contracted gallbladder.  Small gallstones present.  1 stone appears to be in the gallbladder neck region, 4 mm size.  Borderline gallbladder wall thickening, 3 mm wall thickness, may be secondary to the nondistended state of the gallbladder.  However there is no Chew sign, or gallbladder dilation, and no surrounding fluid.  Common bile duct normal caliber, 4 mm.  LOCATION:  [facility]    Dictated by (CST): Martin Mcdermott MD on 2/04/2024 at 9:46 PM     Finalized by (CST): Matrin Mcdermott MD on 2/04/2024 at 9:48 PM       CT ABDOMEN+PELVIS(CONTRAST ONLY)(CPT=74177)    Result Date: 1/30/2024  CONCLUSION:   1.  No acute abdominal pelvic process.  2. Diverticulosis of the left colon without diverticulitis.  3. Cholelithiasis  without ductal dilatation.   LOCATION:  [facility]   Dictated by (CST): Kumar Trammell MD on 1/30/2024 at 10:51 AM     Finalized by (CST): Kumar Trammell MD on 1/30/2024 at 10:54 AM    images independent reviewed there is cholelithiasis        MDM      Initial differential diagnosis includes gastritis cholelithiasis cholecystitis pancreatitis peptic ulcer disease patient declined any pain medication but rated about 6 out of 10 scale patient has persistent pain for several days with cholelithiasis and stone in the neck of the gallbladder general surgery is consulted patient was admitted for possible cholecystectomy.  Admission disposition: 2/4/2024 11:16 PM                                        Medical Decision Making      Disposition and Plan     Clinical Impression:  1. Calculus of gallbladder without cholecystitis without obstruction    2. Cholecystitis         Disposition:  Admit  2/4/2024 11:16 pm    Follow-up:  Bobby Winston MD  [address]  [facility] [address]  [phone]    Follow up      Rudi Kaplan MD  [address]  [facility] [address]  [phone]    Follow up in 2 week(s)            Medications Prescribed:  Discharge Medication List as of 2/5/2024  6:19 PM                            Hospital Problems       Present on Admission  Date Reviewed: 3/22/2022            ICD-10-CM Noted POA    * (Principal) Calculus of gallbladder without cholecystitis without obstruction K80.20 2/4/2024 Unknown                     Signed by Bronson Howard MD on 2/6/2024 12:18 PM         MEDICATIONS ADMINISTERED IN LAST 1 DAY:  Administration History       None            Vitals (last day) before discharge       Date/Time Temp Pulse Resp BP SpO2 Weight O2 Device O2 Flow Rate (L/min) [facility]    02/05/24 1447 98.7 °F (37.1 °C) 57 18 159/81 92 % -- None (Room air) -- RC    02/05/24 1415 -- 56 19 158/85 94 % -- -- -- NG    02/05/24 1405 -- 54 21 165/89 93 % -- -- -- NG    02/05/24 1350 -- 57 15 177/92 98 % -- -- --  NG    02/05/24 1335 -- 62 10 187/97 100 % -- -- -- NG    02/05/24 1330 -- 58 15 -- 100 % -- -- -- NG    02/05/24 1325 -- 52 13 185/100 98 % -- -- -- NG    02/05/24 1320 97.3 °F (36.3 °C) 55 16 178/96 96 % -- None (Room air) -- NG    02/05/24 1111 98.8 °F (37.1 °C) 66 14 154/90 99 % -- None (Room air) -- TB    02/05/24 0525 98.8 °F (37.1 °C) 64 -- 158/87 98 % -- None (Room air) -- SF    02/05/24 0100 -- -- -- 163/90 -- -- -- -- SF    02/05/24 0033 98.7 °F (37.1 °C) -- -- -- -- -- None (Room air) 0 L/min HB    02/05/24 0033 -- 67 -- 172/91 -- -- -- --     02/04/24 2330 -- 67 -- 168/92 98 % -- -- -- BS    02/04/24 2230 -- 64 -- 159/103 95 % -- -- -- BS    02/04/24 2225 -- 60 16 159/97 -- -- -- --     02/04/24 2030 -- 60 -- 168/95 96 % -- -- --     02/04/24 2026 -- 66 16 180/97 97 % -- None (Room air) -- BS    02/04/24 1936 98.3 °F (36.8 °C) 72 20 170/98 98 % 130 lb None (Room air) --          2/5 GEN SURGERY CONSULT NOTE  Reason for Consultation:     Surgical Consultation      CC:       Chief Complaint   Patient presents with    Chest Pain Angina         History of Present Illness:     Sharon Quiñones is a a(n) 58 year old female. Patient reports feeling a burning in her upper abdomen last week describes as burning in her mid upper abdomen, radiating to her shoulder. She was evaluated at the [facility] where a ct scan was obtained revealing a 4mm gallstone in neck of gb. She was dc home and was evaluated by her GI physician the next day and her PPI was increased to daily.   Patient reports noticing after eating pizza/wine for dinner an increase in her pain, Sunday morning rodgers breakfast sandwich her pain progressively worsened in intensity and length of time. Describes mid abdominal pain though slightly to the left. Some belching, no emesis.   She was evaluated in the ER where an US as obtained revealing cholelithiasis, borderline gb wall thickening. No pericholecystic fluid present   She reports feeling this pain for  at least one month though didn't realize it was her gb, she has h/o scoliosis and does have trigger point injections secondary to her muscular pain.  She denies any previous abdominal surgery  Denies any use of anticoagulation at home         Impression:     57 y/o with biliary colic  Pancreatitis   US as noted above  No leukocytosis  Lipase with mild elevation of 108 today  Lfts without elevation  Soft, non distended, mild epigastric tenderness extending to LUQ     Plan:     Will repeat labs this morning including lipase  NPO  IV fluids  IV antibiotics  Encourage ambulation/IS  Reviewed surgical management with patient including risks of surgery  Would proceed surgically though would like to see the trend of lipase  All questions answered    2/5 H&P    Chief Complaint:  Abdominal Pain      History of Present Illness: Sharon Quiñones is a 58 year old female with history of gerd, diverticulitis, hld, htn, prediabetes presenting with abdominal pain. Patient says that the pain is in the upper abdomen and has radiated to her shoulders. She has been seen in the [facility] where she had a CT that showed a gallstone in the neck of the gallbladder.  She was to be seen by gastroenterology as an outpatient who started her on PPI as her symptoms seem to get worse after eating food.  She is also had symptoms of belching but no vomiting her pain has been on the right side but has at times been on the left side as well.  Ultimately since symptoms were progressively worse without any improvement she came to emergency room for further evaluation and treatment.  Patient denies any other positive review of systems.         ASSESSMENT / PLAN:   58 year old female with history of gerd, diverticulitis, hld, htn, prediabetes presenting with abdominal pain.     Abdominal Pain  -etiology uncertain  -pt with ct showing gallstones with one in the neck  -no obvious perichole fluid  -surgery consulted--> OR today  -npo  -IV pain mew     HTN  -sbp  stable  -lisinopril     HLD  -atorvastatin      GERD  -pantoprazole     Quality:  DVT Prophylaxis: scd  CODE status:   Hickman: none     Plan of care discussed with patient and staff     Dispo: no discharge    2/5 GEN SURGERY NOTE       2/5/2024     Sharon Quiñones     PRE-OPERATIVE DIAGNOSIS:                       Cholecystitis with cholelithiasis     POST-OPERATIVE DIAGNOSIS:                     Cholecystitis with cholelithiasis     PROCEDURE:                                                 Laparoscopic cholecystectomy     SURGEON:                                                     Rudi Kaplan MD     ASSISTANT:                                                   Lillian Macario sa     A surgical assistant was medically necessary and needed for the entire procedure to help with positioning, prepping, instrument passing and holding, opening, closing, and suturing.     --------------  DISCHARGE REVIEW    Payor: BLUE [employer] PPO  Subscriber #:  [id number]  Authorization Number: [id number]    Admit date: 2/5/24  Admit time:  12:23 AM  Discharge Date: 2/5/2024  6:50 PM     Admitting Physician: Laith Gibbons MD  Attending Physician:  No att. providers found  Primary Care Physician: Bobby Winston MD       Discharge Summary Notes    No notes of this type exist for this encounter.         REVIEWER COMMENTS  Discharged Home via Wheelchair.  Accompanied by Family member  Belongings Taken by patient/family.     VSS, tolerating diet, voiding adequately, pain controlled, tolerating ambulating. Writing RN spoke w/ hospitalist and Sx, pt ok to dc. Discharge education provided. Reviewed medications and follow up appts. All questions answered and concerns addressed, pt verbalized understanding. Pt dc in stable condition.     1445 - Pt back from OR. VSS. Pain controlled. Voided. Laps x4 w/ skin glue, C/D/I. POC discussed w/ pt and spouse, all questions answered and concerns addressed. Safety precautions in place. Frequent  rounds performed.

## 2024-03-14 ENCOUNTER — HOSPITAL ENCOUNTER (OUTPATIENT)
Dept: MAMMOGRAPHY | Age: 59
Discharge: HOME | End: 2024-03-14
Attending: NURSE PRACTITIONER
Payer: COMMERCIAL

## 2024-03-14 DIAGNOSIS — Z12.31 ENCOUNTER FOR SCREENING MAMMOGRAM FOR MALIGNANT NEOPLASM OF BREAST: ICD-10-CM

## 2024-03-14 PROCEDURE — 77067 SCR MAMMO BI INCL CAD: CPT | Performed by: NURSE PRACTITIONER

## 2024-03-14 PROCEDURE — 77063 BREAST TOMOSYNTHESIS BI: CPT | Performed by: NURSE PRACTITIONER

## (undated) DEVICE — BLADE SURG NO11 SS POLYMER COAT STR MICROCOAT

## (undated) DEVICE — POUCH SPECIMEN WIRE 6X3 250ML

## (undated) DEVICE — APPLICATOR SKIN PREP 26ML HI LT ORNG 2% CHG

## (undated) DEVICE — Device

## (undated) DEVICE — PROBE ELECTROCAUTERY L HK TIP MPLR INSUL SHFT

## (undated) DEVICE — SYSTEM OPT ACCS L100MM TIP 5MM 1ST ENTRY KII

## (undated) DEVICE — APPLIER CLP MD/LG PRT 5MM TI G UNIV EPIX

## (undated) DEVICE — SUTURE VCRL 4-0 L18IN ABSRB UD L19MM PS-2 3/8

## (undated) DEVICE — ADHESIVE SKIN TOP FOR WND CLSR DERMBND ADV

## (undated) DEVICE — SUTURE MCRYL SZ 4-0 L27IN ABSRB UD L19MM PS-2

## (undated) DEVICE — SUTURE VCRL 0 L27IN ABSRB VLT L26MM UR-6 5/8

## (undated) DEVICE — SLEEVE COMPR M KNEE LEN SGL USE KENDALL SCD

## (undated) DEVICE — SLEEVE TRCR L100MM DIA5MM ABD Z THRD KII

## (undated) DEVICE — DISSECTOR MIRCO TIP MARYLAND

## (undated) DEVICE — NEEDLE VERESS 120MM

## (undated) DEVICE — TROCAR ENDOSCP L100MM DIA11MM Z THRD FOR ABD

## (undated) DEVICE — COVER LT HNDL RIG FOR SUR CAM DISP

## (undated) DEVICE — SOLUTION IRRIG 1000ML 0.9% NACL USP BTL

## (undated) DEVICE — GLOVE SUR 7 PROTEXIS PI PIP CRM PWD F

## (undated) DEVICE — PACK PBDS LAP CHOLE MODULE

## (undated) DEVICE — SYSTEM POS W20XH1XL29IN PT PIGAZZI

## (undated) DEVICE — TIP GRSP DISP GRAB RENEW

## (undated) DEVICE — SCISSORS ES TIP L19.3MM DISP ENDOCUT

## (undated) NOTE — LETTER
September 1, 2021        Sarah Manisha Cárdenas [id number]      Provider- THE [facility]      Imaging Orders  HIDA-Hepatobiliary scan    Results  Your test results were reviewed by your physician without significant abnormalities.

## (undated) NOTE — ED AVS SNAPSHOT
Jn Teran   MRN: [id number]    Department:  THE [facility] Emergency Department in [place]   Date of Visit:  8/29/2018           Disclosure     Insurance plans vary and the physician(s) referred by the ER may not be covered by your plan.  Please contact tell this physician (or your personal doctor if your instructions are to return to your personal doctor) about any new or lasting problems. The primary care or specialist physician will see patients referred from the [facility] Emergency Department.  Freddy Lewis

## (undated) NOTE — LETTER
Date & Time: 3/5/2020, 5:16 PM  Patient: Bekah Patel  Encounter Provider(s):    NISSA Monson       To Whom It May Concern:    Pepe Mckeon was seen and treated in our department on 3/5/2020.  She should not return to work until 3/9/2020,